# Patient Record
Sex: FEMALE | Race: OTHER | HISPANIC OR LATINO | URBAN - METROPOLITAN AREA
[De-identification: names, ages, dates, MRNs, and addresses within clinical notes are randomized per-mention and may not be internally consistent; named-entity substitution may affect disease eponyms.]

---
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
## 2023-01-06 LAB — SURGICAL PATHOLOGY STUDY: SIGNIFICANT CHANGE UP
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
## 2023-05-21 ENCOUNTER — EMERGENCY (EMERGENCY)
Facility: HOSPITAL | Age: 18
LOS: 1 days | Discharge: HOME | End: 2023-05-21
Attending: EMERGENCY MEDICINE
Payer: MEDICAID

## 2023-05-21 VITALS
HEART RATE: 94 BPM | DIASTOLIC BLOOD PRESSURE: 64 MMHG | SYSTOLIC BLOOD PRESSURE: 94 MMHG | RESPIRATION RATE: 16 BRPM | OXYGEN SATURATION: 95 % | TEMPERATURE: 98 F | WEIGHT: 115.08 LBS

## 2023-05-21 DIAGNOSIS — Z98.891 HISTORY OF UTERINE SCAR FROM PREVIOUS SURGERY: Chronic | ICD-10-CM

## 2023-05-21 DIAGNOSIS — Z98.890 OTHER SPECIFIED POSTPROCEDURAL STATES: Chronic | ICD-10-CM

## 2023-05-21 PROCEDURE — 10060 I&D ABSCESS SIMPLE/SINGLE: CPT

## 2023-05-21 PROCEDURE — 99283 EMERGENCY DEPT VISIT LOW MDM: CPT | Mod: 25

## 2023-05-21 NOTE — ED ADULT NURSE NOTE - NSFALLUNIVINTERV_ED_ALL_ED
Bed/Stretcher in lowest position, wheels locked, appropriate side rails in place/Call bell, personal items and telephone in reach/Instruct patient to call for assistance before getting out of bed/chair/stretcher/Non-slip footwear applied when patient is off stretcher/[place] to call system/Physically safe environment - no spills, clutter or unnecessary equipment/Purposeful proactive rounding/Room/bathroom lighting operational, light cord in reach

## 2023-05-21 NOTE — ED ADULT TRIAGE NOTE - CHIEF COMPLAINT QUOTE
used
Pt presents to ED c/o a boil on her back. States she popped it last night and copious amounts of puss came out. Reports pain today.

## 2023-05-21 NOTE — ED PROVIDER NOTE - PATIENT PORTAL LINK FT
You can access the FollowMyHealth Patient Portal offered by [facility] by registering at the following website: [email or web address]. By joining [employer]’s FollowMyHealth portal, you will also be able to view your health information using other applications (apps) compatible with our system.
yes

## 2023-05-21 NOTE — ED ADULT NURSE NOTE - CHIEF COMPLAINT QUOTE
Pt presents to ED c/o a boil on her back. States she popped it last night and copious amounts of puss came out. Reports pain today.

## 2023-05-21 NOTE — ED PROVIDER NOTE - ATTENDING APP SHARED VISIT CONTRIBUTION OF CARE
19yo F pw boil to the back since yesterday. notes that felt like a pimple that she popped but today the area felt swollen. mild nausea. Denies f/c/v/cp/sob/palpitations/ cough/rash/headache/dizziness/abd.pain/d/c/dysuria/hematuria    Head: atraumatic, normacephalic  Face: atraumatic, no crepitus no orbiral/maxillary/mandibular ttp  throat: uvula midline no exudates  eyes: perrla eomi  heart: rrr s1s2  lungs: ctab  abd: soft, nt nd +bs no rebound/guarding no cva ttp  skin: warm  LE: no swelling, no calf ttp  back: no midline cervical/thoracic/lumbar ttp  skin: + fluctuant draining area  + redness to left upper chest    -->abscess currently draining will i+d abx dc

## 2023-05-21 NOTE — ED PROVIDER NOTE - OBJECTIVE STATEMENT
17 y/o F c/o abscess on back x 3 days.  Patient states that it drained lots of pus yesterday.  Denies fever.

## 2023-05-21 NOTE — ED ADULT NURSE NOTE - NS PRO PASSIVE SMOKE EXP
Called by RN, on tele pt with bursts of PAF for 2 seconds to 170.  Pt has been asymptomatic.   D/W cardiology pt is s/p diagnostic cath w/radial access.   Xarelto was given at 3 am.   Called this morning at 6:30am  by RN pt briefly went HR of 42.   Pt back in NSR 50s-70s.   Vitals are stable.     f/u with cardiology in am     Vanesa Collins ANP-BC  [id number]. Unknown

## 2023-05-21 NOTE — ED ADULT NURSE NOTE - OBJECTIVE STATEMENT
Pt with wound to mid back that she states she was able to get a lot of pus out of last night. Pt noted to have small 3cm "boil" to mid back midline with spine. Pt with purulent draining noted and mild erythema around the site. Pt denies fever/chills.

## 2023-07-04 NOTE — ED PROVIDER NOTE - CLINICAL SUMMARY MEDICAL DECISION MAKING FREE TEXT BOX
presents for urine tox however now refusing test
sent in s/p abnormal CT abd done yesterday, hx dementia

## 2023-07-06 NOTE — ED ADULT NURSE NOTE - BRAND OF COVID-19 VACCINATION
Attempted to contact patient's mother to advise that patient is due for labs. [phone] rang then ringing stops. No VM.  [phone] has no VM, phone rings several times then rapid busy signal. Pfizer dose 1 and 2

## 2023-09-19 NOTE — OB RN PATIENT PROFILE - BILL OF RIGHTS/ADMISSION INFORMATION PROVIDED TO:
Ensure you are drinking plenty of fluids, especially water. Avoid spicy, greasy foods. Avoid caffeine and chocolate. You may take over the counter pepcid twice a day as directed for your GERD. Tylenol for pain. If you are not allergic to ibuprofen you may take this as well. Follow up with your primary care provider as needed or if no improvement. Return to the ED for worsening signs and symptoms or otherwise as needed.     
Patient

## 2023-10-19 NOTE — OB RN PATIENT PROFILE - VISION (WITH CORRECTIVE LENSES IF THE PATIENT USUALLY WEARS THEM):
normal/sore large/normal/compressible/sore Normal vision: sees adequately in most situations; can see medication labels, newsprint

## 2023-10-21 NOTE — OB RN PATIENT PROFILE - LIMIT VISITORS, INFANT PROFILE
48F with PMH of HTN, HLD and anxiety and PSHx of remote appendectomy, who first presented for abdominal pain after sexual intercourse. Admitted to surgery for treatment of SBO, which has since resolved. Continues to have abdominal pain and diarrhea, so GI consulted due to possible concern for IBD.     Given recent colonoscopy (August 2023) which was largely normal and no prior history of GI related issues, do not suspect IBD at this time. Suspect symptoms most likely 2/2 to infectious process, like gastroenteritis given patient came in with tachycardia, fever, and bandemia.      Recommendations:   - c diff and stool culture recently negative   - send GI PCR   - f/u read of repeat CT abdomen/pelvis     Case discussed with Dr. Rincon. GI Team will continue to follow.     Crystal Mak D.O.   Gastroenterology Fellow  Weekday 7am-5pm Pager: [phone]  Weeknights/Weekend/Holiday Coverage: Please call the  for contact info     no

## 2024-05-13 ENCOUNTER — OUTPATIENT (OUTPATIENT)
Dept: OUTPATIENT SERVICES | Facility: HOSPITAL | Age: 19
LOS: 1 days | End: 2024-05-13
Payer: MEDICAID

## 2024-05-13 ENCOUNTER — APPOINTMENT (OUTPATIENT)
Dept: ANTEPARTUM | Facility: HOSPITAL | Age: 19
End: 2024-05-13
Payer: MEDICAID

## 2024-05-13 VITALS — SYSTOLIC BLOOD PRESSURE: 97 MMHG | HEART RATE: 64 BPM | RESPIRATION RATE: 16 BRPM | DIASTOLIC BLOOD PRESSURE: 64 MMHG

## 2024-05-13 VITALS
HEART RATE: 69 BPM | RESPIRATION RATE: 16 BRPM | SYSTOLIC BLOOD PRESSURE: 107 MMHG | TEMPERATURE: 98 F | DIASTOLIC BLOOD PRESSURE: 70 MMHG

## 2024-05-13 DIAGNOSIS — O26.899 OTHER SPECIFIED PREGNANCY RELATED CONDITIONS, UNSPECIFIED TRIMESTER: ICD-10-CM

## 2024-05-13 DIAGNOSIS — Z98.891 HISTORY OF UTERINE SCAR FROM PREVIOUS SURGERY: Chronic | ICD-10-CM

## 2024-05-13 DIAGNOSIS — Z98.890 OTHER SPECIFIED POSTPROCEDURAL STATES: Chronic | ICD-10-CM

## 2024-05-13 LAB
APPEARANCE UR: ABNORMAL
BACTERIA # UR AUTO: ABNORMAL /HPF
BASOPHILS # BLD AUTO: 0.01 K/UL — SIGNIFICANT CHANGE UP (ref 0–0.2)
BASOPHILS NFR BLD AUTO: 0.3 % — SIGNIFICANT CHANGE UP (ref 0–2)
BILIRUB UR-MCNC: NEGATIVE — SIGNIFICANT CHANGE UP
COLOR SPEC: YELLOW — SIGNIFICANT CHANGE UP
DIFF PNL FLD: NEGATIVE — SIGNIFICANT CHANGE UP
EOSINOPHIL # BLD AUTO: 0.03 K/UL — SIGNIFICANT CHANGE UP (ref 0–0.5)
EOSINOPHIL NFR BLD AUTO: 0.8 % — SIGNIFICANT CHANGE UP (ref 0–6)
GLUCOSE UR QL: NEGATIVE MG/DL — SIGNIFICANT CHANGE UP
HBV SURFACE AG SERPL QL IA: SIGNIFICANT CHANGE UP
HCT VFR BLD CALC: 31.7 % — LOW (ref 34.5–45)
HGB BLD-MCNC: 10.5 G/DL — LOW (ref 11.5–15.5)
IMM GRANULOCYTES NFR BLD AUTO: 0.3 % — SIGNIFICANT CHANGE UP (ref 0–0.9)
KETONES UR-MCNC: NEGATIVE MG/DL — SIGNIFICANT CHANGE UP
LEUKOCYTE ESTERASE UR-ACNC: ABNORMAL
LYMPHOCYTES # BLD AUTO: 1.34 K/UL — SIGNIFICANT CHANGE UP (ref 1–3.3)
LYMPHOCYTES # BLD AUTO: 35.4 % — SIGNIFICANT CHANGE UP (ref 13–44)
MCHC RBC-ENTMCNC: 30.7 PG — SIGNIFICANT CHANGE UP (ref 27–34)
MCHC RBC-ENTMCNC: 33.1 GM/DL — SIGNIFICANT CHANGE UP (ref 32–36)
MCV RBC AUTO: 92.7 FL — SIGNIFICANT CHANGE UP (ref 80–100)
MONOCYTES # BLD AUTO: 0.28 K/UL — SIGNIFICANT CHANGE UP (ref 0–0.9)
MONOCYTES NFR BLD AUTO: 7.4 % — SIGNIFICANT CHANGE UP (ref 2–14)
NEUTROPHILS # BLD AUTO: 2.12 K/UL — SIGNIFICANT CHANGE UP (ref 1.8–7.4)
NEUTROPHILS NFR BLD AUTO: 55.8 % — SIGNIFICANT CHANGE UP (ref 43–77)
NITRITE UR-MCNC: NEGATIVE — SIGNIFICANT CHANGE UP
PH UR: 7.5 — SIGNIFICANT CHANGE UP (ref 5–8)
PLATELET # BLD AUTO: 155 K/UL — SIGNIFICANT CHANGE UP (ref 150–400)
PROT UR-MCNC: SIGNIFICANT CHANGE UP MG/DL
RBC # BLD: 3.42 M/UL — LOW (ref 3.8–5.2)
RBC # FLD: 13.2 % — SIGNIFICANT CHANGE UP (ref 10.3–14.5)
RBC CASTS # UR COMP ASSIST: 1 /HPF — SIGNIFICANT CHANGE UP (ref 0–4)
SP GR SPEC: 1.02 — SIGNIFICANT CHANGE UP (ref 1–1.03)
SQUAMOUS # UR AUTO: 7 /HPF — HIGH (ref 0–5)
UROBILINOGEN FLD QL: 1 MG/DL — SIGNIFICANT CHANGE UP (ref 0.2–1)
WBC # BLD: 3.79 K/UL — LOW (ref 3.8–10.5)
WBC # FLD AUTO: 3.79 K/UL — LOW (ref 3.8–10.5)
WBC UR QL: 15 /HPF — HIGH (ref 0–5)

## 2024-05-13 PROCEDURE — 81001 URINALYSIS AUTO W/SCOPE: CPT

## 2024-05-13 PROCEDURE — 86762 RUBELLA ANTIBODY: CPT

## 2024-05-13 PROCEDURE — 86803 HEPATITIS C AB TEST: CPT

## 2024-05-13 PROCEDURE — 85025 COMPLETE CBC W/AUTO DIFF WBC: CPT

## 2024-05-13 PROCEDURE — 76819 FETAL BIOPHYS PROFIL W/O NST: CPT | Mod: 26

## 2024-05-13 PROCEDURE — 87077 CULTURE AEROBIC IDENTIFY: CPT

## 2024-05-13 PROCEDURE — 87086 URINE CULTURE/COLONY COUNT: CPT

## 2024-05-13 PROCEDURE — 36415 COLL VENOUS BLD VENIPUNCTURE: CPT

## 2024-05-13 PROCEDURE — 86765 RUBEOLA ANTIBODY: CPT

## 2024-05-13 PROCEDURE — 59025 FETAL NON-STRESS TEST: CPT

## 2024-05-13 PROCEDURE — 76820 UMBILICAL ARTERY ECHO: CPT | Mod: 26,59

## 2024-05-13 PROCEDURE — 76816 OB US FOLLOW-UP PER FETUS: CPT | Mod: 26

## 2024-05-13 PROCEDURE — 87186 SC STD MICRODIL/AGAR DIL: CPT

## 2024-05-13 PROCEDURE — G0463: CPT

## 2024-05-13 PROCEDURE — 86780 TREPONEMA PALLIDUM: CPT

## 2024-05-13 PROCEDURE — 96374 THER/PROPH/DIAG INJ IV PUSH: CPT

## 2024-05-13 PROCEDURE — 87340 HEPATITIS B SURFACE AG IA: CPT

## 2024-05-13 RX ORDER — ACETAMINOPHEN 500 MG
1000 TABLET ORAL ONCE
Refills: 0 | Status: COMPLETED | OUTPATIENT
Start: 2024-05-13 | End: 2024-05-13

## 2024-05-13 RX ADMIN — Medication 400 MILLIGRAM(S): at 17:34

## 2024-05-13 RX ADMIN — Medication 1000 MILLIGRAM(S): at 18:04

## 2024-05-13 NOTE — OB RN TRIAGE NOTE - BP NONINVASIVE SYSTOLIC (MM HG)
[facility]   Diabetes Management       Date:  10/11/2023    Patient:  Cydney Soto     MRN:  [id number]    Gestational age:  28w5d       Diagnosis:  Gestational Diabetes A1    Management Role:  Phase I:  Counseling and Education     Insulin dosing:   N/a - Goal at this time is diet-control.     Summary   The patient was seen for gestational diabetes education. Pt accompanied this morning by supportive spouse Jese. Pt has started BG checks. Review when to check BGs. Discuss BG targets for pregnancy.   Meal planning and problem solving discussed. Patient verbalizes understanding. Pt given a copy of BG log.   Pt reports scheduled OB f/u the .   Thank you for this referral. Please don't hesitate to contact me with questions.     Referring provider:  Cydney Hsu, Aprn  [address]  [place] [address]      Cydney Goldstein RN, BSN, [facility]  10/11/2023  15:02 EDT   107

## 2024-05-13 NOTE — OB RN TRIAGE NOTE - CHIEF COMPLAINT QUOTE
I had bleeding but I had no baby sitting but they told me I had to come to the hospital and now I'm here.

## 2024-05-13 NOTE — OB PROVIDER TRIAGE NOTE - NSOBPROVIDERNOTE_OBGYN_ALL_OB_FT
A/P: 18y  at 32w4d GA who presents to L&D for rule out  labor    SSE 0/0/-3  UA WNL  Ofirmev for pain  Recommend use of belly band  Prenatals sent  Fetus: 130 baseline, moderate variability, + accels, -decels  [place]: No contractions  Not in labor at this time  Dispo: Pending    Discussed with Dr. Vragas A/P: 18y  at 32w4d GA who presents to L&D for rule out  labor    SSE 0/0/-3  UA WNL  Ofirmev for pain  Recommend use of belly band  Prenatals sent  Growth 1680g, BPP 8/8, RHIANNA 10.7cm; Vertex anterior; consistent with dates. Images uploaded to [facility]  Fetus: 130 baseline, moderate variability, + accels, -decels  [place]: No contractions  Not in labor at this time  Dispo: Home with precautions    Discussed with Dr. Vargas A/P: 18y  at 32w4d GA who presents to L&D for rule out  labor    SSE 0/0/-3  UA WNL  Ofirmev for pain  Recommend use of belly band  Prenatals sent  Growth 1680g (7th percentile), BPP 8/8, RHIANNA 10.7cm; Vertex anterior; consistent with dates. Images uploaded to [facility]  Needs outpatient MFM consult  Fetus: 130 baseline, moderate variability, + accels, -decels  [place]: No contractions  Not in labor at this time  Dispo: Home with precautions    Discussed with Dr. Vargas

## 2024-05-13 NOTE — OB PROVIDER TRIAGE NOTE - NSHPPHYSICALEXAM_GEN_ALL_CORE
T(C): 36.8 (05-13-24 @ 17:01), Max: 36.8 (05-13-24 @ 17:00)  HR: 69 (05-13-24 @ 17:01) (69 - 69)  BP: 107/70 (05-13-24 @ 17:01) (107/70 - 107/70)  RR: 16 (05-13-24 @ 17:01) (16 - 16)  SpO2: --  Gen: NAD, well-appearing  Abd: soft, gravid  Ext: non-edematous, non-tender   SVE: 0/0/-3  FHT: 130 baseline, moderate variability, + accels, -decels  [place]: No contractions

## 2024-05-13 NOTE — OB RN TRIAGE NOTE - FALL HARM RISK - RISK INTERVENTIONS

## 2024-05-13 NOTE — OB PROVIDER TRIAGE NOTE - HISTORY OF PRESENT ILLNESS
JANENE MONROE is a 18y  at 32w4d GA who presents to L&D for abdominal and back pain. patient states that she has pain consistently, and does not feel any contractions.     Pt denies vaginal bleeding, contractions and leakage of fluid. She endorses good fetal movement.   Pt denies trauma.  Pt denies headaches, visual disturbances, RUQ pain, epigastric pain and new-onset edema.   She denies any urinary complaints.   She denies fevers, chills, nausea, vomiting.   She denies shortness of breath, chest pain, and palpitations.    Pregnancy course is significant for:  Limited PNC    POB: CSx2, G1: previa at 35 wks. G2 rCS uncomplicated  PGYN: -fibroids/-cysts, denied STD hx, denies abnormal PAPs  PMH: Anemia  PSH: CSx2, hernia repair  SH: Uses marijuana during pregnancy  Meds: PNV  All: NKDA

## 2024-05-14 LAB
HCV AB S/CO SERPL IA: 0.08 S/CO — SIGNIFICANT CHANGE UP (ref 0–0.99)
HCV AB SERPL-IMP: SIGNIFICANT CHANGE UP
T PALLIDUM AB TITR SER: NEGATIVE — SIGNIFICANT CHANGE UP

## 2024-05-15 DIAGNOSIS — Z3A.32 32 WEEKS GESTATION OF PREGNANCY: ICD-10-CM

## 2024-05-15 DIAGNOSIS — R10.9 UNSPECIFIED ABDOMINAL PAIN: ICD-10-CM

## 2024-05-15 DIAGNOSIS — O99.891 OTHER SPECIFIED DISEASES AND CONDITIONS COMPLICATING PREGNANCY: ICD-10-CM

## 2024-05-15 DIAGNOSIS — M54.9 DORSALGIA, UNSPECIFIED: ICD-10-CM

## 2024-05-15 DIAGNOSIS — O34.219 MATERNAL CARE FOR UNSPECIFIED TYPE SCAR FROM PREVIOUS CESAREAN DELIVERY: ICD-10-CM

## 2024-05-15 DIAGNOSIS — O26.893 OTHER SPECIFIED PREGNANCY RELATED CONDITIONS, THIRD TRIMESTER: ICD-10-CM

## 2024-05-15 DIAGNOSIS — D64.9 ANEMIA, UNSPECIFIED: ICD-10-CM

## 2024-05-15 DIAGNOSIS — F12.90 CANNABIS USE, UNSPECIFIED, UNCOMPLICATED: ICD-10-CM

## 2024-05-15 DIAGNOSIS — O99.343 OTHER MENTAL DISORDERS COMPLICATING PREGNANCY, THIRD TRIMESTER: ICD-10-CM

## 2024-05-15 DIAGNOSIS — O09.13 SUPERVISION OF PREGNANCY WITH HISTORY OF ECTOPIC PREGNANCY, THIRD TRIMESTER: ICD-10-CM

## 2024-05-15 DIAGNOSIS — O99.013 ANEMIA COMPLICATING PREGNANCY, THIRD TRIMESTER: ICD-10-CM

## 2024-05-15 LAB
MEV IGG SER-ACNC: >300 AU/ML — SIGNIFICANT CHANGE UP
MEV IGG+IGM SER-IMP: POSITIVE — SIGNIFICANT CHANGE UP
RUBV IGG SER-ACNC: 5.1 INDEX — SIGNIFICANT CHANGE UP
RUBV IGG SER-IMP: POSITIVE — SIGNIFICANT CHANGE UP

## 2024-05-21 ENCOUNTER — OUTPATIENT (OUTPATIENT)
Dept: OUTPATIENT SERVICES | Facility: HOSPITAL | Age: 19
LOS: 1 days | End: 2024-05-21
Payer: MEDICAID

## 2024-05-21 VITALS — HEART RATE: 73 BPM | SYSTOLIC BLOOD PRESSURE: 110 MMHG | DIASTOLIC BLOOD PRESSURE: 67 MMHG

## 2024-05-21 VITALS
TEMPERATURE: 98 F | DIASTOLIC BLOOD PRESSURE: 53 MMHG | RESPIRATION RATE: 19 BRPM | SYSTOLIC BLOOD PRESSURE: 109 MMHG | HEART RATE: 84 BPM

## 2024-05-21 DIAGNOSIS — O26.899 OTHER SPECIFIED PREGNANCY RELATED CONDITIONS, UNSPECIFIED TRIMESTER: ICD-10-CM

## 2024-05-21 DIAGNOSIS — Z98.891 HISTORY OF UTERINE SCAR FROM PREVIOUS SURGERY: Chronic | ICD-10-CM

## 2024-05-21 DIAGNOSIS — Z98.890 OTHER SPECIFIED POSTPROCEDURAL STATES: Chronic | ICD-10-CM

## 2024-05-21 PROCEDURE — 83986 ASSAY PH BODY FLUID NOS: CPT

## 2024-05-21 PROCEDURE — 59025 FETAL NON-STRESS TEST: CPT

## 2024-05-21 PROCEDURE — G0463: CPT

## 2024-05-21 RX ORDER — FOSFOMYCIN TROMETHAMINE 3 G/1
1 POWDER ORAL
Qty: 1 | Refills: 0
Start: 2024-05-21

## 2024-05-21 NOTE — OB PROVIDER TRIAGE NOTE - HISTORY OF PRESENT ILLNESS
19y  @33.5w GA presents to L&D c/o suprapubic pain and a gush of fluid around noon. Patient states that she had a gush of clear liquid on her way to the bathroom, denies any continued leakage. She also reports persistent anterior abdominal pain. Denies hematuria, dysuria. Denies contractions, vaginal bleeding, decreased fetal movement. She was seen in triage a couple of weeks ago for similar complaints, UCx positive for UTI, not yet treated. Denies fever/chills. Of note, she found out about her pregnancy in her 5th month, used alcohol prior and currently uses marijuana. She has not had any prenatal care.    Pregnancy course:  lack of prenatal care    POB: CSx2, G1: previa at 35 wks. G2 rCS uncomplicated  PGYN: -fibroids/-cysts, denied STD hx, denies abnormal PAPs  PMH: Anemia  PSH: CSx2, hernia repair  SH: Uses marijuana during pregnancy  Meds: PNV  All: NKDA

## 2024-05-21 NOTE — OB PROVIDER TRIAGE NOTE - NSHPPHYSICALEXAM_GEN_ALL_CORE
Vital Signs Last 24 Hrs  T(C): 36.9 (21 May 2024 16:59), Max: 36.9 (21 May 2024 16:43)  T(F): 98.42 (21 May 2024 16:59), Max: 98.42 (21 May 2024 16:59)  HR: 73 (21 May 2024 18:17) (73 - 84)  BP: 110/67 (21 May 2024 18:17) (109/53 - 110/67)  RR: 19 (21 May 2024 16:59) (19 - 19)    Parameters below as of 21 May 2024 16:43  Patient On (Oxygen Delivery Method): room air    Gen: well-appearing, NAD  Neuro: A&Ox3  Resp: breathing comfortably on RA   Abd: nontender, gravid   SSE: cervix appeared closed, no leakage with valsalva, no pooling in vagina, nitrazine negative  FHT: baseline 125, mod variability, +accels, -decels  [place]: no ctx

## 2024-05-21 NOTE — OB PROVIDER TRIAGE NOTE - NSOBPROVIDERNOTE_OBGYN_ALL_OB_FT
19y  @33.5w GA evaluated for leakage of fluid and abdominal pain.    - suprapubic pain likely secondary to UTI, fosfomycin 3g sent to pharmacy  - pelvic exam w/o vaginal pooling or leakage of fluid, nitrazine negative, RHIANNA 5.35cm, decreased from prior ultrasound but still within normal limits. Pt has follow up with [facility] tomorrow at 0930. Return precautions reviewed  - FHT reactive, rare contractions  - pt to follow up w MFM and Ob

## 2024-05-21 NOTE — OB RN TRIAGE NOTE - NS_TRIAGEADDITIONAL COMMENTS_OBGYN_ALL_OB_FT
[FreeTextEntry1] : -discussed being seen in the ER if constant fevers, he defers at this time, reports he was seen in the ER and discharged home -follow-up lab work  -follow-up with ID  -information for [facility] clinic provide as he will be transferring care given personal cost with recent insurance loss  -he will call me and let me know progress/when he has an appointment  Amniswab negative, no fluid noted on spec exam. Bedside sono done for RHIANNA. Pt discharged home with RX to be picked up for UTI. Pt given discharge instructions. PT is without any questions or complaints

## 2024-05-21 NOTE — OB RN TRIAGE NOTE - FALL HARM RISK - RISK INTERVENTIONS

## 2024-05-21 NOTE — OB RN TRIAGE NOTE - NSSDOHTHREATEN_OBGYN_A_OB
Post-application: Motor, sensory, and vascular responses intact in the injured extremity./Pre-application: Motor, sensory, and vascular responses intact in the injured extremity./The patient/caregiver verbalized understanding of how to care for the injured extremity with splint
never

## 2024-05-23 DIAGNOSIS — Z03.71 ENCOUNTER FOR SUSPECTED PROBLEM WITH AMNIOTIC CAVITY AND MEMBRANE RULED OUT: ICD-10-CM

## 2024-05-23 DIAGNOSIS — Z3A.33 33 WEEKS GESTATION OF PREGNANCY: ICD-10-CM

## 2024-05-23 DIAGNOSIS — D64.9 ANEMIA, UNSPECIFIED: ICD-10-CM

## 2024-05-23 DIAGNOSIS — O36.5930 MATERNAL CARE FOR OTHER KNOWN OR SUSPECTED POOR FETAL GROWTH, THIRD TRIMESTER, NOT APPLICABLE OR UNSPECIFIED: ICD-10-CM

## 2024-05-23 DIAGNOSIS — O99.013 ANEMIA COMPLICATING PREGNANCY, THIRD TRIMESTER: ICD-10-CM

## 2024-05-27 ENCOUNTER — OUTPATIENT (OUTPATIENT)
Dept: INPATIENT UNIT | Facility: HOSPITAL | Age: 19
LOS: 1 days | End: 2024-05-27
Payer: MEDICAID

## 2024-05-27 VITALS
TEMPERATURE: 98 F | RESPIRATION RATE: 17 BRPM | HEART RATE: 70 BPM | DIASTOLIC BLOOD PRESSURE: 67 MMHG | SYSTOLIC BLOOD PRESSURE: 103 MMHG

## 2024-05-27 VITALS
DIASTOLIC BLOOD PRESSURE: 62 MMHG | SYSTOLIC BLOOD PRESSURE: 105 MMHG | RESPIRATION RATE: 19 BRPM | TEMPERATURE: 99 F | HEART RATE: 66 BPM

## 2024-05-27 DIAGNOSIS — Z98.891 HISTORY OF UTERINE SCAR FROM PREVIOUS SURGERY: Chronic | ICD-10-CM

## 2024-05-27 DIAGNOSIS — Z98.890 OTHER SPECIFIED POSTPROCEDURAL STATES: Chronic | ICD-10-CM

## 2024-05-27 DIAGNOSIS — O26.899 OTHER SPECIFIED PREGNANCY RELATED CONDITIONS, UNSPECIFIED TRIMESTER: ICD-10-CM

## 2024-05-27 LAB
ALBUMIN SERPL ELPH-MCNC: 3.2 G/DL — LOW (ref 3.3–5.2)
ALP SERPL-CCNC: 118 U/L — SIGNIFICANT CHANGE UP (ref 40–120)
ALT FLD-CCNC: 7 U/L — SIGNIFICANT CHANGE UP
AMYLASE P1 CFR SERPL: 81 U/L — SIGNIFICANT CHANGE UP (ref 36–128)
ANION GAP SERPL CALC-SCNC: 14 MMOL/L — SIGNIFICANT CHANGE UP (ref 5–17)
APPEARANCE UR: ABNORMAL
APPEARANCE UR: CLEAR — SIGNIFICANT CHANGE UP
APTT BLD: 27.3 SEC — SIGNIFICANT CHANGE UP (ref 24.5–35.6)
AST SERPL-CCNC: 16 U/L — SIGNIFICANT CHANGE UP
BACTERIA # UR AUTO: ABNORMAL /HPF
BACTERIA # UR AUTO: ABNORMAL /HPF
BASOPHILS # BLD AUTO: 0 K/UL — SIGNIFICANT CHANGE UP (ref 0–0.2)
BASOPHILS NFR BLD AUTO: 0 % — SIGNIFICANT CHANGE UP (ref 0–2)
BILIRUB SERPL-MCNC: 0.4 MG/DL — SIGNIFICANT CHANGE UP (ref 0.4–2)
BILIRUB UR-MCNC: NEGATIVE — SIGNIFICANT CHANGE UP
BILIRUB UR-MCNC: NEGATIVE — SIGNIFICANT CHANGE UP
BUN SERPL-MCNC: 5.2 MG/DL — LOW (ref 8–20)
CALCIUM SERPL-MCNC: 8.7 MG/DL — SIGNIFICANT CHANGE UP (ref 8.4–10.5)
CAST: 0 /LPF — SIGNIFICANT CHANGE UP (ref 0–4)
CAST: 5 /LPF — HIGH (ref 0–4)
CHLORIDE SERPL-SCNC: 99 MMOL/L — SIGNIFICANT CHANGE UP (ref 96–108)
CO2 SERPL-SCNC: 23 MMOL/L — SIGNIFICANT CHANGE UP (ref 22–29)
COLOR SPEC: YELLOW — SIGNIFICANT CHANGE UP
COLOR SPEC: YELLOW — SIGNIFICANT CHANGE UP
CREAT ?TM UR-MCNC: 27 MG/DL — SIGNIFICANT CHANGE UP
CREAT SERPL-MCNC: 0.47 MG/DL — LOW (ref 0.5–1.3)
DIFF PNL FLD: NEGATIVE — SIGNIFICANT CHANGE UP
DIFF PNL FLD: NEGATIVE — SIGNIFICANT CHANGE UP
EGFR: 139 ML/MIN/1.73M2 — SIGNIFICANT CHANGE UP
EOSINOPHIL # BLD AUTO: 0.06 K/UL — SIGNIFICANT CHANGE UP (ref 0–0.5)
EOSINOPHIL NFR BLD AUTO: 1.7 % — SIGNIFICANT CHANGE UP (ref 0–6)
FIBRINOGEN PPP-MCNC: 430 MG/DL — SIGNIFICANT CHANGE UP (ref 200–450)
GIANT PLATELETS BLD QL SMEAR: PRESENT — SIGNIFICANT CHANGE UP
GLUCOSE SERPL-MCNC: 66 MG/DL — LOW (ref 70–99)
GLUCOSE UR QL: NEGATIVE MG/DL — SIGNIFICANT CHANGE UP
GLUCOSE UR QL: NEGATIVE MG/DL — SIGNIFICANT CHANGE UP
HCT VFR BLD CALC: 30.9 % — LOW (ref 34.5–45)
HGB BLD-MCNC: 10.8 G/DL — LOW (ref 11.5–15.5)
HPIV3 RNA SPEC QL NAA+PROBE: DETECTED
INR BLD: 0.84 RATIO — LOW (ref 0.85–1.18)
KETONES UR-MCNC: NEGATIVE MG/DL — SIGNIFICANT CHANGE UP
KETONES UR-MCNC: NEGATIVE MG/DL — SIGNIFICANT CHANGE UP
LDH SERPL L TO P-CCNC: 192 U/L — SIGNIFICANT CHANGE UP (ref 98–192)
LEUKOCYTE ESTERASE UR-ACNC: ABNORMAL
LEUKOCYTE ESTERASE UR-ACNC: NEGATIVE — SIGNIFICANT CHANGE UP
LIDOCAIN IGE QN: 32 U/L — SIGNIFICANT CHANGE UP (ref 22–51)
LYMPHOCYTES # BLD AUTO: 0.72 K/UL — LOW (ref 1–3.3)
LYMPHOCYTES # BLD AUTO: 21.8 % — SIGNIFICANT CHANGE UP (ref 13–44)
MANUAL SMEAR VERIFICATION: SIGNIFICANT CHANGE UP
MCHC RBC-ENTMCNC: 31.5 PG — SIGNIFICANT CHANGE UP (ref 27–34)
MCHC RBC-ENTMCNC: 35 GM/DL — SIGNIFICANT CHANGE UP (ref 32–36)
MCV RBC AUTO: 90.1 FL — SIGNIFICANT CHANGE UP (ref 80–100)
MONOCYTES # BLD AUTO: 0.26 K/UL — SIGNIFICANT CHANGE UP (ref 0–0.9)
MONOCYTES NFR BLD AUTO: 7.8 % — SIGNIFICANT CHANGE UP (ref 2–14)
NEUTROPHILS # BLD AUTO: 2.27 K/UL — SIGNIFICANT CHANGE UP (ref 1.8–7.4)
NEUTROPHILS NFR BLD AUTO: 68.7 % — SIGNIFICANT CHANGE UP (ref 43–77)
NITRITE UR-MCNC: NEGATIVE — SIGNIFICANT CHANGE UP
NITRITE UR-MCNC: NEGATIVE — SIGNIFICANT CHANGE UP
PH UR: 7.5 — SIGNIFICANT CHANGE UP (ref 5–8)
PH UR: 8.5 (ref 5–8)
PLAT MORPH BLD: NORMAL — SIGNIFICANT CHANGE UP
PLATELET # BLD AUTO: 157 K/UL — SIGNIFICANT CHANGE UP (ref 150–400)
POTASSIUM SERPL-MCNC: 3.6 MMOL/L — SIGNIFICANT CHANGE UP (ref 3.5–5.3)
POTASSIUM SERPL-SCNC: 3.6 MMOL/L — SIGNIFICANT CHANGE UP (ref 3.5–5.3)
PROT ?TM UR-MCNC: 6 MG/DL — SIGNIFICANT CHANGE UP (ref 0–12)
PROT SERPL-MCNC: 6.6 G/DL — SIGNIFICANT CHANGE UP (ref 6.6–8.7)
PROT UR-MCNC: NEGATIVE MG/DL — SIGNIFICANT CHANGE UP
PROT UR-MCNC: SIGNIFICANT CHANGE UP MG/DL
PROT/CREAT UR-RTO: 0.2 RATIO — SIGNIFICANT CHANGE UP
PROTHROM AB SERPL-ACNC: 9.4 SEC — LOW (ref 9.5–13)
RAPID RVP RESULT: DETECTED
RBC # BLD: 3.43 M/UL — LOW (ref 3.8–5.2)
RBC # FLD: 13.8 % — SIGNIFICANT CHANGE UP (ref 10.3–14.5)
RBC BLD AUTO: NORMAL — SIGNIFICANT CHANGE UP
RBC CASTS # UR COMP ASSIST: 0 /HPF — SIGNIFICANT CHANGE UP (ref 0–4)
RBC CASTS # UR COMP ASSIST: 1 /HPF — SIGNIFICANT CHANGE UP (ref 0–4)
SARS-COV-2 RNA SPEC QL NAA+PROBE: SIGNIFICANT CHANGE UP
SMUDGE CELLS # BLD: PRESENT — SIGNIFICANT CHANGE UP
SODIUM SERPL-SCNC: 136 MMOL/L — SIGNIFICANT CHANGE UP (ref 135–145)
SP GR SPEC: 1.02 — SIGNIFICANT CHANGE UP (ref 1–1.03)
SP GR SPEC: <1.005 — LOW (ref 1–1.03)
SQUAMOUS # UR AUTO: 14 /HPF — HIGH (ref 0–5)
SQUAMOUS # UR AUTO: 7 /HPF — HIGH (ref 0–5)
TRANS CELLS #/AREA URNS HPF: PRESENT
URATE SERPL-MCNC: 3.8 MG/DL — SIGNIFICANT CHANGE UP (ref 2.4–5.7)
UROBILINOGEN FLD QL: 0.2 MG/DL — SIGNIFICANT CHANGE UP (ref 0.2–1)
UROBILINOGEN FLD QL: 1 MG/DL — SIGNIFICANT CHANGE UP (ref 0.2–1)
WBC # BLD: 3.31 K/UL — LOW (ref 3.8–10.5)
WBC # FLD AUTO: 3.31 K/UL — LOW (ref 3.8–10.5)
WBC UR QL: 2 /HPF — SIGNIFICANT CHANGE UP (ref 0–5)
WBC UR QL: 4 /HPF — SIGNIFICANT CHANGE UP (ref 0–5)

## 2024-05-27 PROCEDURE — 84550 ASSAY OF BLOOD/URIC ACID: CPT

## 2024-05-27 PROCEDURE — 59025 FETAL NON-STRESS TEST: CPT | Mod: 26

## 2024-05-27 PROCEDURE — 85384 FIBRINOGEN ACTIVITY: CPT

## 2024-05-27 PROCEDURE — 81001 URINALYSIS AUTO W/SCOPE: CPT

## 2024-05-27 PROCEDURE — 0225U NFCT DS DNA&RNA 21 SARSCOV2: CPT

## 2024-05-27 PROCEDURE — 83690 ASSAY OF LIPASE: CPT

## 2024-05-27 PROCEDURE — 59025 FETAL NON-STRESS TEST: CPT

## 2024-05-27 PROCEDURE — 96360 HYDRATION IV INFUSION INIT: CPT

## 2024-05-27 PROCEDURE — 36415 COLL VENOUS BLD VENIPUNCTURE: CPT

## 2024-05-27 PROCEDURE — 80053 COMPREHEN METABOLIC PANEL: CPT

## 2024-05-27 PROCEDURE — 85025 COMPLETE CBC W/AUTO DIFF WBC: CPT

## 2024-05-27 PROCEDURE — 82150 ASSAY OF AMYLASE: CPT

## 2024-05-27 PROCEDURE — 82570 ASSAY OF URINE CREATININE: CPT

## 2024-05-27 PROCEDURE — G0463: CPT

## 2024-05-27 PROCEDURE — 99221 1ST HOSP IP/OBS SF/LOW 40: CPT | Mod: 25,GC

## 2024-05-27 PROCEDURE — 85730 THROMBOPLASTIN TIME PARTIAL: CPT

## 2024-05-27 PROCEDURE — 83615 LACTATE (LD) (LDH) ENZYME: CPT

## 2024-05-27 PROCEDURE — 85610 PROTHROMBIN TIME: CPT

## 2024-05-27 PROCEDURE — 84156 ASSAY OF PROTEIN URINE: CPT

## 2024-05-27 RX ORDER — SODIUM CHLORIDE 9 MG/ML
1000 INJECTION, SOLUTION INTRAVENOUS ONCE
Refills: 0 | Status: DISCONTINUED | OUTPATIENT
Start: 2024-05-27 | End: 2024-06-10

## 2024-05-27 NOTE — OB RN TRIAGE NOTE - NSICDXPASTMEDICALHX_GEN_ALL_CORE_FT
PAST MEDICAL HISTORY:  Asthma     Other iron deficiency anemia pt can't specify    Rheumatoid arthritis     Therapeutic

## 2024-05-27 NOTE — OB PROVIDER TRIAGE NOTE - NSHPPHYSICALEXAM_GEN_ALL_CORE
T(C): 36.6 (05-27-24 @ 10:44), Max: 36.6 (05-27-24 @ 10:39)  HR: 65 (05-27-24 @ 12:26) (65 - 70)  BP: 114/61 (05-27-24 @ 12:26) (103/67 - 114/61)  RR: 17 (05-27-24 @ 10:44) (17 - 17)    Gen: NAD  Heart: S1 S2, RRR  Lungs: Clear breath sounds bilaterally, no wheezing, rhonchi, or rales.  Abd: soft, gravid, minimally tender to bilateral upper quadrants  Ext: non-edematous, non-tender   SVE: 0/0/-3 x2  SSE: cervix visualized, closed and without any signs of bleeding or drainage, no pooling   FHT: baseline FHR 130s, moderate variability, +accels, -decels  [place]: irregular infrequent contractions not felt by patient

## 2024-05-27 NOTE — OB PROVIDER TRIAGE NOTE - NSOBPROVIDERNOTE_OBGYN_ALL_OB_FT
JANENE MONROE is a 19y  at 34w4d GA who presents to L&D for flu-like symptoms.    A/P:   -VSS, normotensive, afebrile, HR wnl  -CBC, CMP, amylase, and lipase all wnl for evaluation of abdominal pain.  -Patient presenting with cough, congestion, and cheat pain. Breath sound clear bilaterally. Chest pain likely due to flu-like symptoms and severe cough.  -Pending RVP results.  -BPP 10/10, RHIANNA 9.4, vertex presentation, anterior placenta  -Patient with recent UTI s/p treatment. UCx collected and pending.  -Patient present with toddler. 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 Patient is taking toddler to ED here now for evaluation of flu-like symptoms Recommend patient speak with social work in the ED for resources.  -Return precautions given  -Follow up with OB provider within a week.    Fetus: NST reactive  [place]: irregular infrequent contractions.  Dispo: Clear for discharge    Discussed with Dr. Oscar

## 2024-05-27 NOTE — OB PROVIDER TRIAGE NOTE - ATTENDING COMMENTS
19y  at 34w4d GA who presents to L&D for flu-like symptoms for the past 4 days, herself as well as her toddler and found to be flu positive, no s/s  labor, PE or pneumonia, encouraged rest and hydration. Return precautions discussed. 19y  at 34w4d GA who presents to L&D for flu-like symptoms for the past 4 days, herself as well as her toddler and found to be flu positive, no s/s  labor, PE or pneumonia, encouraged rest and hydration. Return precautions discussed.  Impression NST: reactive

## 2024-05-27 NOTE — CHART NOTE - NSCHARTNOTEFT_GEN_A_CORE
Patient contacted via phone after being discharged from triage. Patient notified of + parainfluenza 3 result on RVP and notified to have any sick contacts evaluated. Patient endorsed understanding and was given the opportunity to ask questions.

## 2024-05-27 NOTE — OB RN TRIAGE NOTE - FALL HARM RISK - RISK INTERVENTIONS

## 2024-05-27 NOTE — OB PROVIDER TRIAGE NOTE - HISTORY OF PRESENT ILLNESS
JANENE MONROE is a 19y  at 34w4d GA who presents to L&D for flu-like symptoms Patient reports cough and congestion for several days. Reports chest pain that feel like "stabbing" when taking a deep breath but also present when not taking a deep breath. Patient reports generalized abdominal pain that is worse with fetal movements. Reports abdominal pain has always been presents during this pregnancy but it worse today. Patient reports decreased fetal movements. Reports fetal movements are usually really active but today she only feels "flutters".     Pt denies vaginal bleeding, contractions and leakage of fluid. She endorses good fetal movement.     Pt denies trauma. Her last cervical exam was . Last sexual intercourse was .     Pt denies headaches, visual disturbances, RUQ pain, epigastric pain and new-onset edema.     She denies any urinary complaints.     She denies fevers, chills, nausea, vomiting.     She denies shortness of breath, chest pain, and palpitations.      Pregnancy course:  lack of prenatal care    POB: CSx2, G1: previa at 35 wks. G2 rCS uncomplicated  PGYN: -fibroids/-cysts, denied STD hx, denies abnormal PAPs  PMH: Anemia  PSH: CSx2, hernia repair  SH: Uses marijuana during pregnancy  Meds: PNV  All: NKDA      T(C): 36.6 (24 @ 10:44), Max: 36.6 (24 @ 10:39)  HR: 65 (24 @ 12:26) (65 - 70)  BP: 114/61 (24 @ 12:26) (103/67 - 114/61)  RR: 17 (24 @ 10:44) (17 - 17)  SpO2: --  Gen: NAD, well-appearing  Heart: S1 S2, RRR  Lungs: CTAB  Abd: soft, gravid  Ext: non-edematous, non-tender   SVE:   SSE: cervix visualized, closed and without any signs of bleeding or drainage, no pooling   FHT:   [place]:    A/P:     Fetus:  [place]:  Dispo: Continue to observe.     Discussed with Dr. Oscar JANENE MONROE is a 19y  at 34w4d GA who presents to L&D for flu-like symptoms. Patient reports cough and congestion for several days. Reports chest pain that feel like "stabbing" when taking a deep breath but also present when not taking a deep breath. Reports SOB due to nasal congestion. Patient reports generalized abdominal pain that is worse with fetal movements. Reports abdominal pain has always been present during this pregnancy but it worse today. Patient reports decreased fetal movements. Reports fetal movements are usually really active but today she only feels "flutters". She denies fevers, chills, nausea, vomiting. Pt denies vaginal bleeding, contractions and leakage of fluid. Pt denies headaches, visual disturbances, and new-onset edema. She denies any urinary complaints. She has a history of a recent UTI for which she completed ABX treatment.    Pregnancy course:  lack of prenatal care    POB: CSx2, G1: previa at 35 wks. G2 rCS uncomplicated  PGYN: -fibroids/-cysts, denied STD hx, denies abnormal PAPs  PMH: Anemia  PSH: CSx2, hernia repair  SH: Uses marijuana during pregnancy  Meds: PNV  All: NKDA

## 2024-05-29 DIAGNOSIS — O23.43 UNSPECIFIED INFECTION OF URINARY TRACT IN PREGNANCY, THIRD TRIMESTER: ICD-10-CM

## 2024-05-29 DIAGNOSIS — D64.9 ANEMIA, UNSPECIFIED: ICD-10-CM

## 2024-05-29 DIAGNOSIS — O34.219 MATERNAL CARE FOR UNSPECIFIED TYPE SCAR FROM PREVIOUS CESAREAN DELIVERY: ICD-10-CM

## 2024-05-29 DIAGNOSIS — O36.8130 DECREASED FETAL MOVEMENTS, THIRD TRIMESTER, NOT APPLICABLE OR UNSPECIFIED: ICD-10-CM

## 2024-05-29 DIAGNOSIS — Z3A.34 34 WEEKS GESTATION OF PREGNANCY: ICD-10-CM

## 2024-05-29 DIAGNOSIS — O99.513 DISEASES OF THE RESPIRATORY SYSTEM COMPLICATING PREGNANCY, THIRD TRIMESTER: ICD-10-CM

## 2024-05-29 DIAGNOSIS — J10.1 INFLUENZA DUE TO OTHER IDENTIFIED INFLUENZA VIRUS WITH OTHER RESPIRATORY MANIFESTATIONS: ICD-10-CM

## 2024-05-29 DIAGNOSIS — O99.013 ANEMIA COMPLICATING PREGNANCY, THIRD TRIMESTER: ICD-10-CM

## 2024-05-29 DIAGNOSIS — J45.909 UNSPECIFIED ASTHMA, UNCOMPLICATED: ICD-10-CM

## 2024-06-04 PROBLEM — J45.909 UNSPECIFIED ASTHMA, UNCOMPLICATED: Chronic | Status: ACTIVE | Noted: 2024-05-27

## 2024-06-07 DIAGNOSIS — O99.019 ANEMIA COMPLICATING PREGNANCY, UNSPECIFIED TRIMESTER: ICD-10-CM

## 2024-06-07 DIAGNOSIS — O28.0 ABNORMAL HEMATOLOGICAL FINDING ON ANTENATAL SCREENING OF MOTHER: ICD-10-CM

## 2024-06-07 DIAGNOSIS — O09.899 SUPERVISION OF OTHER HIGH RISK PREGNANCIES, UNSPECIFIED TRIMESTER: ICD-10-CM

## 2024-06-10 ENCOUNTER — ASOB RESULT (OUTPATIENT)
Age: 19
End: 2024-06-10

## 2024-06-10 ENCOUNTER — APPOINTMENT (OUTPATIENT)
Dept: ANTEPARTUM | Facility: CLINIC | Age: 19
End: 2024-06-10

## 2024-06-10 ENCOUNTER — APPOINTMENT (OUTPATIENT)
Dept: ANTEPARTUM | Facility: CLINIC | Age: 19
End: 2024-06-10
Payer: MEDICAID

## 2024-06-10 ENCOUNTER — APPOINTMENT (OUTPATIENT)
Dept: MATERNAL FETAL MEDICINE | Facility: CLINIC | Age: 19
End: 2024-06-10

## 2024-06-10 ENCOUNTER — APPOINTMENT (OUTPATIENT)
Dept: MATERNAL FETAL MEDICINE | Facility: CLINIC | Age: 19
End: 2024-06-10
Payer: MEDICAID

## 2024-06-10 ENCOUNTER — NON-APPOINTMENT (OUTPATIENT)
Age: 19
End: 2024-06-10

## 2024-06-10 VITALS — DIASTOLIC BLOOD PRESSURE: 68 MMHG | SYSTOLIC BLOOD PRESSURE: 98 MMHG | HEIGHT: 60 IN

## 2024-06-10 VITALS — HEART RATE: 86 BPM | BODY MASS INDEX: 22.66 KG/M2 | WEIGHT: 116 LBS | RESPIRATION RATE: 18 BRPM

## 2024-06-10 VITALS — BODY MASS INDEX: 22.78 KG/M2 | HEART RATE: 86 BPM | RESPIRATION RATE: 16 BRPM | HEIGHT: 60 IN | WEIGHT: 116 LBS

## 2024-06-10 DIAGNOSIS — Z3A.36 36 WEEKS GESTATION OF PREGNANCY: ICD-10-CM

## 2024-06-10 DIAGNOSIS — Z86.59 PERSONAL HISTORY OF OTHER MENTAL AND BEHAVIORAL DISORDERS: ICD-10-CM

## 2024-06-10 DIAGNOSIS — F12.90 CANNABIS USE, UNSPECIFIED, UNCOMPLICATED: ICD-10-CM

## 2024-06-10 DIAGNOSIS — O99.320 DRUG USE COMPLICATING PREGNANCY, UNSPECIFIED TRIMESTER: ICD-10-CM

## 2024-06-10 DIAGNOSIS — O99.013 ANEMIA COMPLICATING PREGNANCY, THIRD TRIMESTER: ICD-10-CM

## 2024-06-10 DIAGNOSIS — O36.5930 MATERNAL CARE FOR OTHER KNOWN OR SUSPECTED POOR FETAL GROWTH, THIRD TRIMESTER, NOT APPLICABLE OR UNSPECIFIED: ICD-10-CM

## 2024-06-10 DIAGNOSIS — Z86.19 PERSONAL HISTORY OF OTHER INFECTIOUS AND PARASITIC DISEASES: ICD-10-CM

## 2024-06-10 DIAGNOSIS — Z87.39 PERSONAL HISTORY OF OTHER DISEASES OF THE MUSCULOSKELETAL SYSTEM AND CONNECTIVE TISSUE: ICD-10-CM

## 2024-06-10 DIAGNOSIS — O36.5990 MATERNAL CARE FOR OTHER KNOWN OR SUSPECTED POOR FETAL GROWTH, UNSPECIFIED TRIMESTER, NOT APPLICABLE OR UNSPECIFIED: ICD-10-CM

## 2024-06-10 DIAGNOSIS — F12.90 DRUG USE COMPLICATING PREGNANCY, UNSPECIFIED TRIMESTER: ICD-10-CM

## 2024-06-10 DIAGNOSIS — O99.330 SMOKING (TOBACCO) COMPLICATING PREGNANCY, UNSPECIFIED TRIMESTER: ICD-10-CM

## 2024-06-10 DIAGNOSIS — O34.219 MATERNAL CARE FOR UNSPECIFIED TYPE SCAR FROM PREVIOUS CESAREAN DELIVERY: ICD-10-CM

## 2024-06-10 DIAGNOSIS — O09.33 SUPERVISION OF PREGNANCY WITH INSUFFICIENT ANTENATAL CARE, THIRD TRIMESTER: ICD-10-CM

## 2024-06-10 DIAGNOSIS — Z64.1 PROBLEMS RELATED TO MULTIPARITY: ICD-10-CM

## 2024-06-10 PROCEDURE — 76818 FETAL BIOPHYS PROFILE W/NST: CPT

## 2024-06-10 PROCEDURE — 99215 OFFICE O/P EST HI 40 MIN: CPT

## 2024-06-10 PROCEDURE — 76820 UMBILICAL ARTERY ECHO: CPT | Mod: 59

## 2024-06-10 PROCEDURE — 76816 OB US FOLLOW-UP PER FETUS: CPT

## 2024-06-10 NOTE — OB HISTORY
[Approximately ___ (Month)] : the LMP was approximately [unfilled] month(s) ago [Pregnancy History] : girl [___] : Delivery occurred at [unfilled] [Spontaneous] : Spontaneous conception [Sonogram] : sonogram [at ___ wks] : at [unfilled] weeks [Normal Amount/Duration] : was abnormal [Spotting Between  Menses] : no spotting between menses [Regular Cycle Intervals] : periods have been irregular [FreeTextEntry1] : First prenatal visit was on 5/22/2024.

## 2024-06-10 NOTE — DISCUSSION/SUMMARY
[FreeTextEntry1] : She is 36 weeks and 4 days gestation by ultrasound dating.  Patient has had insufficient prenatal care.  Pregnancy is dated by an ultrasound done at [facility] on 2024.  The SUNG is 2024.  She had an ultrasound examination at [facility] on 2024 that reported the estimated fetal weight to be at the 7th percentile for gestational age.  The fetal abdominal circumference measurement was at the 6th percentile for gestational age.  These findings were suggestive of fetal growth restriction (FGR).  The umbilical artery Doppler velocimetry was within normal limits.  Today, patient had an ultrasound examination that reported the estimated fetal weight to be 4 pounds 12 ounces which is at the 2nd percentile for gestational age.  The fetal abdominal circumference measurement was less than the 1st percentile for the gestational age.  These findings are consistent with severe fetal growth restriction.  The amniotic fluid volume was normal. The umbilical artery S/D ratio was normal for gestational age. The middle cerebral artery Doppler study has increased diastolic velocity. There was fetal brain sparing based on the normal MCA:UA ratio. The left uterine artery Doppler PI was elevated above the 95% for gestational age with absent notching in the velocity waveform. This finding is suggestive of reduced perfusion of the maternal utero-placental circulation. I told her that suboptimal perfusion of the maternal placental circulation is the most common cause of fetal growth restriction and accounts for 30% to 40% of all cases. The fetus was found to be healthy with a normal biophysical profile score (8/10). At this time there is no evidence of severe placental insufficiency in view of normal amniotic fluid volume and normal umbilical artery Doppler studies.   This pregnancy has also been complicated by maternal anemia and marijuana smoking which have been associated with fetal growth restriction. I told her that many babies that are suspected to have fetal growth restriction are constitutionally small or healthy small babies.  I told her that fetal growth restriction has been associated with an increased risk for having adverse  outcomes such as intrauterine demise,  death, and  morbidity such as: hypoglycemia, hyperbilirubinemia, hypothermia, intraventricular hemorrhage, necrotizing enterocolitis, seizures, sepsis, respiratory distress syndrome, and  death.  I recommend twice weekly fetal surveillance with BPP and Doppler studies of the umbilical artery, middle cerebral artery, and uterine artery until delivery. I also recommend delivery at 37 weeks of gestation due to the severe FGR (ACOG Committee Opinion No. 818, 2021).   She had 2  section deliveries, and she was advised to have a repeat  section delivery.  I told her that multiple repeat  deliveries can result in serious maternal morbidity.  I told her that she is at risk for dense adhesions, more difficult surgery, placenta accreta, bowel injury, cystotomy, ureteral injury, ileus, hysterectomy, blood transfusions, intensive care unit admission, and  delivery.  She is multiparous and we discussed the various methods available for contraception after delivery. She told me that she wants to have an operative sterilization procedure after the repeat  delivery.

## 2024-06-12 ENCOUNTER — OUTPATIENT (OUTPATIENT)
Dept: INPATIENT UNIT | Facility: HOSPITAL | Age: 19
LOS: 1 days | End: 2024-06-12
Payer: MEDICAID

## 2024-06-12 ENCOUNTER — TRANSCRIPTION ENCOUNTER (OUTPATIENT)
Age: 19
End: 2024-06-12

## 2024-06-12 VITALS — DIASTOLIC BLOOD PRESSURE: 85 MMHG | SYSTOLIC BLOOD PRESSURE: 124 MMHG | HEART RATE: 65 BPM

## 2024-06-12 VITALS — HEART RATE: 71 BPM | SYSTOLIC BLOOD PRESSURE: 119 MMHG | DIASTOLIC BLOOD PRESSURE: 60 MMHG

## 2024-06-12 DIAGNOSIS — Z98.890 OTHER SPECIFIED POSTPROCEDURAL STATES: Chronic | ICD-10-CM

## 2024-06-12 DIAGNOSIS — Z98.891 HISTORY OF UTERINE SCAR FROM PREVIOUS SURGERY: Chronic | ICD-10-CM

## 2024-06-12 DIAGNOSIS — O26.899 OTHER SPECIFIED PREGNANCY RELATED CONDITIONS, UNSPECIFIED TRIMESTER: ICD-10-CM

## 2024-06-12 PROCEDURE — 59025 FETAL NON-STRESS TEST: CPT

## 2024-06-12 PROCEDURE — G0463: CPT

## 2024-06-12 NOTE — OB PROVIDER TRIAGE NOTE - NSHPPHYSICALEXAM_GEN_ALL_CORE
Vital Signs Last 24 Hrs  T(C): 36.7 (12 Jun 2024 19:23), Max: 36.7 (12 Jun 2024 19:23)  T(F): 98.1 (12 Jun 2024 19:23), Max: 98.1 (12 Jun 2024 19:23)  HR: 65 (12 Jun 2024 21:02) (65 - 71)  BP: 124/85 (12 Jun 2024 21:02) (119/60 - 124/85)  RR: 17 (12 Jun 2024 19:23) (17 - 17)    Parameters below as of 12 Jun 2024 19:23  Patient On (Oxygen Delivery Method): room air    Gen: well-appearing, NAD  Neuro: A&Ox3  Resp: breathing comfortably on RA   Abd: nontender, gravid   VE: clp, no pooling on spec exam, physiologic discharge present  FHT: baseline 130, mod variability, -accels, -decels  [place]: irregular ctx

## 2024-06-12 NOTE — OB RN TRIAGE NOTE - NSDCTEMPSITE_OBGYN_A_OB
[FreeTextEntry1] : JERMAINE     Is here today because on a recent exam by the pediatrician, they were noted to have mild to moderate asymmetry in their back. The pediatrician ordered an xray and referred them to see pediatric orthopaedics. \par \par Has used a brace for treatment: No\par \par \par They deny any history of pain and fever, any history of numbness or tingling. Any history of change in bladder or bowel function. No history of weakness and denies any history of bug or tick bites or rashes.\par \par No family history of scoliosis\par \par See below for past medical/surgical history\par 
oral

## 2024-06-12 NOTE — OB RN TRIAGE NOTE - HOW OFTEN DO YOU HAVE A DRINK CONTAINING ALCOHOL?
Problem: OCCUPATIONAL THERAPY ADULT  Goal: Performs self-care activities at highest level of function for planned discharge setting. See evaluation for individualized goals. Description:   Outcome: Progressing  Note: Limitation: Decreased ADL status, Decreased UE ROM, Decreased UE strength, Decreased Safe judgement during ADL, Decreased cognition, Decreased endurance, Decreased self-care trans, Decreased high-level ADLs  Prognosis: Good  Assessment: Patient participated in Skilled OT session this date with interventions consisting of  functional transfers, functional mobility*,  therapeutic activities to: increase activity tolerance and increase dynamic sit/ stand balance during functional activity  . Patient agreeable to OT treatment session, upon arrival patient was found seated at edge of bed. Treatment session as follows: Pt declines ADL needs. Pt requires SPV/CGA w RW for transfers & ambulation in room. Pt maintains O2 >85% with all activity. Pt also limited by cognition. Patient continues to be functioning below baseline level, occupational performance remains limited secondary to factors listed above and increased risk for falls and injury. The patient's raw score on the -PAC Daily Activity inpatient short form is 17, standardized score is 37.26, less than 39.4. Patients at this level are likely to benefit from DC to post-acute rehabilitation services. From OT standpoint, recommendation at time of d/c would be level 2. Patient to benefit from continued Occupational Therapy treatment while in the hospital to address deficits as defined above and maximize level of functional independence with ADLs and functional mobility. Pt seen as a co-tx with PT due to the patient's co-morbidities, clinically unstable presentation, and present impairments which are a regression from the patient's baseline. Pt left with call bell in reach, tray table in reach, needs met, chair alarm activated, SCDs on. Never

## 2024-06-12 NOTE — OB PROVIDER TRIAGE NOTE - NSOBPROVIDERNOTE_OBGYN_ALL_OB_FT
19y  @36.6w GA evaluated for leakage of fluid.    - membranes intact: no pooling on speculum exam, RHIANNA 6.59cc, clp on digital exam  - bedside sono w BPP 8/10, FHT w mod variability and no decels  - to return tomorrow for scheduled rCS  - clear for dc, return precautions reviewed    discussed with Dr. Lemos

## 2024-06-12 NOTE — OB PROVIDER TRIAGE NOTE - HISTORY OF PRESENT ILLNESS
19y  @36.6w GA was sent in for evaluation of leakage of fluid. Pt states that she's been experiencing intermittent loss of fluid since 1000 this morning. Denies contractions, bright red bleeding, and decreased fetal movement.     Pregnancy course:  fetal growth restriction <2%ile, rec delivery @37w GA  prior CSx2  marijuana use in pregnancy    POB: CSx2, G1: previa at 35 wks. G2 rCS uncomplicated  PGYN: -fibroids/-cysts, denied STD hx, denies abnormal PAPs  PMH: Anemia  PSH: CSx2, hernia repair  SH: Uses marijuana during pregnancy  Meds: PNV  All: NKDA   7

## 2024-06-12 NOTE — OB RN TRIAGE NOTE - NSNURSINGINSTR_OBGYN_ALL_OB_FT
Patient given discharge instructions and education provided. Patient to return tomorrow for scheduled c/s. Patient states understanding. All questions answered.

## 2024-06-13 ENCOUNTER — TRANSCRIPTION ENCOUNTER (OUTPATIENT)
Age: 19
End: 2024-06-13

## 2024-06-13 ENCOUNTER — APPOINTMENT (OUTPATIENT)
Dept: ANTEPARTUM | Facility: CLINIC | Age: 19
End: 2024-06-13

## 2024-06-13 ENCOUNTER — INPATIENT (INPATIENT)
Facility: HOSPITAL | Age: 19
LOS: 0 days | Discharge: HOME | End: 2024-06-14
Attending: OBSTETRICS & GYNECOLOGY | Admitting: OBSTETRICS & GYNECOLOGY
Payer: MEDICAID

## 2024-06-13 VITALS
HEIGHT: 60 IN | SYSTOLIC BLOOD PRESSURE: 112 MMHG | WEIGHT: 115.08 LBS | TEMPERATURE: 98 F | DIASTOLIC BLOOD PRESSURE: 77 MMHG | HEART RATE: 58 BPM | RESPIRATION RATE: 18 BRPM

## 2024-06-13 DIAGNOSIS — D64.9 ANEMIA, UNSPECIFIED: ICD-10-CM

## 2024-06-13 DIAGNOSIS — F12.90 CANNABIS USE, UNSPECIFIED, UNCOMPLICATED: ICD-10-CM

## 2024-06-13 DIAGNOSIS — O36.599: ICD-10-CM

## 2024-06-13 DIAGNOSIS — Z98.890 OTHER SPECIFIED POSTPROCEDURAL STATES: Chronic | ICD-10-CM

## 2024-06-13 DIAGNOSIS — O99.513 DISEASES OF THE RESPIRATORY SYSTEM COMPLICATING PREGNANCY, THIRD TRIMESTER: ICD-10-CM

## 2024-06-13 DIAGNOSIS — Z3A.36 36 WEEKS GESTATION OF PREGNANCY: ICD-10-CM

## 2024-06-13 DIAGNOSIS — O99.013 ANEMIA COMPLICATING PREGNANCY, THIRD TRIMESTER: ICD-10-CM

## 2024-06-13 DIAGNOSIS — O34.219 MATERNAL CARE FOR UNSPECIFIED TYPE SCAR FROM PREVIOUS CESAREAN DELIVERY: ICD-10-CM

## 2024-06-13 DIAGNOSIS — Z87.59 PERSONAL HISTORY OF OTHER COMPLICATIONS OF PREGNANCY, CHILDBIRTH AND THE PUERPERIUM: ICD-10-CM

## 2024-06-13 DIAGNOSIS — Z03.71 ENCOUNTER FOR SUSPECTED PROBLEM WITH AMNIOTIC CAVITY AND MEMBRANE RULED OUT: ICD-10-CM

## 2024-06-13 DIAGNOSIS — Z98.891 HISTORY OF UTERINE SCAR FROM PREVIOUS SURGERY: Chronic | ICD-10-CM

## 2024-06-13 DIAGNOSIS — O99.323 DRUG USE COMPLICATING PREGNANCY, THIRD TRIMESTER: ICD-10-CM

## 2024-06-13 DIAGNOSIS — J45.909 UNSPECIFIED ASTHMA, UNCOMPLICATED: ICD-10-CM

## 2024-06-13 LAB
BASOPHILS # BLD AUTO: 0.02 K/UL — SIGNIFICANT CHANGE UP (ref 0–0.2)
BASOPHILS NFR BLD AUTO: 0.5 % — SIGNIFICANT CHANGE UP (ref 0–2)
BLD GP AB SCN SERPL QL: SIGNIFICANT CHANGE UP
EOSINOPHIL # BLD AUTO: 0.02 K/UL — SIGNIFICANT CHANGE UP (ref 0–0.5)
EOSINOPHIL NFR BLD AUTO: 0.5 % — SIGNIFICANT CHANGE UP (ref 0–6)
HCT VFR BLD CALC: 38.3 % — SIGNIFICANT CHANGE UP (ref 34.5–45)
HGB BLD-MCNC: 12.7 G/DL — SIGNIFICANT CHANGE UP (ref 11.5–15.5)
HIV 1 & 2 AB SERPL IA.RAPID: SIGNIFICANT CHANGE UP
IMM GRANULOCYTES NFR BLD AUTO: 0 % — SIGNIFICANT CHANGE UP (ref 0–0.9)
LYMPHOCYTES # BLD AUTO: 1.34 K/UL — SIGNIFICANT CHANGE UP (ref 1–3.3)
LYMPHOCYTES # BLD AUTO: 31.4 % — SIGNIFICANT CHANGE UP (ref 13–44)
MCHC RBC-ENTMCNC: 29.7 PG — SIGNIFICANT CHANGE UP (ref 27–34)
MCHC RBC-ENTMCNC: 33.2 GM/DL — SIGNIFICANT CHANGE UP (ref 32–36)
MCV RBC AUTO: 89.5 FL — SIGNIFICANT CHANGE UP (ref 80–100)
MONOCYTES # BLD AUTO: 0.3 K/UL — SIGNIFICANT CHANGE UP (ref 0–0.9)
MONOCYTES NFR BLD AUTO: 7 % — SIGNIFICANT CHANGE UP (ref 2–14)
NEUTROPHILS # BLD AUTO: 2.59 K/UL — SIGNIFICANT CHANGE UP (ref 1.8–7.4)
NEUTROPHILS NFR BLD AUTO: 60.6 % — SIGNIFICANT CHANGE UP (ref 43–77)
PLATELET # BLD AUTO: 236 K/UL — SIGNIFICANT CHANGE UP (ref 150–400)
RBC # BLD: 4.28 M/UL — SIGNIFICANT CHANGE UP (ref 3.8–5.2)
RBC # FLD: 13.6 % — SIGNIFICANT CHANGE UP (ref 10.3–14.5)
WBC # BLD: 4.27 K/UL — SIGNIFICANT CHANGE UP (ref 3.8–10.5)
WBC # FLD AUTO: 4.27 K/UL — SIGNIFICANT CHANGE UP (ref 3.8–10.5)

## 2024-06-13 PROCEDURE — 99222 1ST HOSP IP/OBS MODERATE 55: CPT

## 2024-06-13 PROCEDURE — 88307 TISSUE EXAM BY PATHOLOGIST: CPT | Mod: 26

## 2024-06-13 PROCEDURE — 93010 ELECTROCARDIOGRAM REPORT: CPT

## 2024-06-13 RX ORDER — SODIUM CHLORIDE 9 MG/ML
1000 INJECTION, SOLUTION INTRAVENOUS
Refills: 0 | Status: DISCONTINUED | OUTPATIENT
Start: 2024-06-13 | End: 2024-06-13

## 2024-06-13 RX ORDER — CEFAZOLIN SODIUM 1 G
2000 VIAL (EA) INJECTION ONCE
Refills: 0 | Status: COMPLETED | OUTPATIENT
Start: 2024-06-13 | End: 2024-06-13

## 2024-06-13 RX ORDER — SIMETHICONE 80 MG/1
80 TABLET, CHEWABLE ORAL EVERY 4 HOURS
Refills: 0 | Status: DISCONTINUED | OUTPATIENT
Start: 2024-06-13 | End: 2024-06-14

## 2024-06-13 RX ORDER — LANOLIN
1 OINTMENT (GRAM) TOPICAL EVERY 6 HOURS
Refills: 0 | Status: DISCONTINUED | OUTPATIENT
Start: 2024-06-13 | End: 2024-06-14

## 2024-06-13 RX ORDER — SODIUM CHLORIDE 9 MG/ML
1000 INJECTION, SOLUTION INTRAVENOUS
Refills: 0 | Status: DISCONTINUED | OUTPATIENT
Start: 2024-06-13 | End: 2024-06-14

## 2024-06-13 RX ORDER — FAMOTIDINE 10 MG/ML
20 INJECTION INTRAVENOUS ONCE
Refills: 0 | Status: COMPLETED | OUTPATIENT
Start: 2024-06-13 | End: 2024-06-13

## 2024-06-13 RX ORDER — KETOROLAC TROMETHAMINE 30 MG/ML
30 SYRINGE (ML) INJECTION EVERY 6 HOURS
Refills: 0 | Status: DISCONTINUED | OUTPATIENT
Start: 2024-06-13 | End: 2024-06-14

## 2024-06-13 RX ORDER — DIPHENHYDRAMINE HCL 50 MG
25 CAPSULE ORAL EVERY 6 HOURS
Refills: 0 | Status: DISCONTINUED | OUTPATIENT
Start: 2024-06-13 | End: 2024-06-14

## 2024-06-13 RX ORDER — OXYTOCIN 10 UNIT/ML
333.33 VIAL (ML) INJECTION
Qty: 20 | Refills: 0 | Status: DISCONTINUED | OUTPATIENT
Start: 2024-06-13 | End: 2024-06-14

## 2024-06-13 RX ORDER — ACETAMINOPHEN 500 MG
975 TABLET ORAL
Refills: 0 | Status: DISCONTINUED | OUTPATIENT
Start: 2024-06-13 | End: 2024-06-14

## 2024-06-13 RX ORDER — DIPHENHYDRAMINE HCL 50 MG
25 CAPSULE ORAL EVERY 6 HOURS
Refills: 0 | Status: COMPLETED | OUTPATIENT
Start: 2024-06-13 | End: 2025-05-12

## 2024-06-13 RX ORDER — TRANEXAMIC ACID 100 MG/ML
1000 INJECTION, SOLUTION INTRAVENOUS ONCE
Refills: 0 | Status: COMPLETED | OUTPATIENT
Start: 2024-06-13 | End: 2024-06-13

## 2024-06-13 RX ORDER — IBUPROFEN 200 MG
600 TABLET ORAL EVERY 6 HOURS
Refills: 0 | Status: COMPLETED | OUTPATIENT
Start: 2024-06-13 | End: 2025-05-12

## 2024-06-13 RX ORDER — ENOXAPARIN SODIUM 100 MG/ML
40 INJECTION SUBCUTANEOUS EVERY 24 HOURS
Refills: 0 | Status: DISCONTINUED | OUTPATIENT
Start: 2024-06-14 | End: 2024-06-14

## 2024-06-13 RX ORDER — CHLORHEXIDINE GLUCONATE 213 G/1000ML
1 SOLUTION TOPICAL DAILY
Refills: 0 | Status: DISCONTINUED | OUTPATIENT
Start: 2024-06-13 | End: 2024-06-13

## 2024-06-13 RX ORDER — SCOPALAMINE 1 MG/3D
1 PATCH, EXTENDED RELEASE TRANSDERMAL ONCE
Refills: 0 | Status: COMPLETED | OUTPATIENT
Start: 2024-06-13 | End: 2024-06-13

## 2024-06-13 RX ORDER — OXYCODONE HYDROCHLORIDE 5 MG/1
5 TABLET ORAL
Refills: 0 | Status: COMPLETED | OUTPATIENT
Start: 2024-06-13 | End: 2024-06-20

## 2024-06-13 RX ORDER — OXYCODONE HYDROCHLORIDE 5 MG/1
5 TABLET ORAL ONCE
Refills: 0 | Status: DISCONTINUED | OUTPATIENT
Start: 2024-06-13 | End: 2024-06-14

## 2024-06-13 RX ORDER — ACETAMINOPHEN 500 MG
975 TABLET ORAL ONCE
Refills: 0 | Status: COMPLETED | OUTPATIENT
Start: 2024-06-13 | End: 2024-06-13

## 2024-06-13 RX ORDER — ETONOGESTREL 68 MG/1
68 IMPLANT SUBCUTANEOUS ONCE
Refills: 0 | Status: DISCONTINUED | OUTPATIENT
Start: 2024-06-13 | End: 2024-06-14

## 2024-06-13 RX ORDER — CITRIC ACID/SODIUM CITRATE 300-500 MG
30 SOLUTION, ORAL ORAL ONCE
Refills: 0 | Status: COMPLETED | OUTPATIENT
Start: 2024-06-13 | End: 2024-06-13

## 2024-06-13 RX ORDER — TETANUS TOXOID, REDUCED DIPHTHERIA TOXOID AND ACELLULAR PERTUSSIS VACCINE, ADSORBED 5; 2.5; 8; 8; 2.5 [IU]/.5ML; [IU]/.5ML; UG/.5ML; UG/.5ML; UG/.5ML
0.5 SUSPENSION INTRAMUSCULAR ONCE
Refills: 0 | Status: DISCONTINUED | OUTPATIENT
Start: 2024-06-13 | End: 2024-06-14

## 2024-06-13 RX ORDER — MAGNESIUM HYDROXIDE 400 MG/1
30 TABLET, CHEWABLE ORAL
Refills: 0 | Status: DISCONTINUED | OUTPATIENT
Start: 2024-06-13 | End: 2024-06-14

## 2024-06-13 RX ADMIN — SODIUM CHLORIDE 200 MILLILITER(S): 9 INJECTION, SOLUTION INTRAVENOUS at 10:38

## 2024-06-13 RX ADMIN — Medication 975 MILLIGRAM(S): at 11:02

## 2024-06-13 RX ADMIN — Medication 2000 MILLIGRAM(S): at 12:32

## 2024-06-13 RX ADMIN — FAMOTIDINE 20 MILLIGRAM(S): 10 INJECTION INTRAVENOUS at 11:03

## 2024-06-13 RX ADMIN — Medication 30 MILLILITER(S): at 11:02

## 2024-06-13 RX ADMIN — SCOPALAMINE 1 PATCH: 1 PATCH, EXTENDED RELEASE TRANSDERMAL at 11:04

## 2024-06-13 RX ADMIN — CHLORHEXIDINE GLUCONATE 1 APPLICATION(S): 213 SOLUTION TOPICAL at 11:02

## 2024-06-13 RX ADMIN — TRANEXAMIC ACID 220 MILLIGRAM(S): 100 INJECTION, SOLUTION INTRAVENOUS at 13:00

## 2024-06-13 RX ADMIN — Medication 30 MILLIGRAM(S): at 13:20

## 2024-06-13 NOTE — OB PROVIDER DELIVERY SUMMARY - NSPROVIDERDELIVERYNOTE_OBGYN_ALL_OB_FT
Pt taken to the OR for repeat C/S at 37w due to severe FGR EFW 2%tile AC 1<tile.  Spinal anesthesia.  Low segment transverse  section was performed.  Delivered live male infant, vtx, through moderate amount of clear amniotic fluid.  No nuchal cord.  Adhesions noted from omentum to anterior abdominal wall. SHANNON noted to be thin.  Bilateral fallopian tubes and ovaries WNL.   Hysterotomy was reapproximated with 0- vicryl suture, excellent hemostasis obtained.  Peritoneum and rectus muscle noted to be hemostatic. Fascia were reapproximated with 1-vicryl suture, excellent hemostasis obtained.  SubQ tissue reapproximated with 2-0 plain gut suture.  Skin closed with 3-0Monocryl on Demetrius needle.  Attention was then turned to patient left arm. Skin was prepped with betadine. 2cc of local anesthetic injected 5cm superior to the medial epicondyle and 2cm inferior to the sulcus. Small incision was made with a scalpel. Nexplanon was inserted at this location. Steri strip applied to incision and pressure dressing applied.     weight 2030g, 4lbs 7oz.  Skin-to-skin initiated in OR and continued into RR.  APGAR 9/9.   QBL: 497  IVF:   Uop:  No intraoperative complications Pt taken to the OR for repeat C/S at 37w due to severe FGR EFW 2%tile AC 1<tile.  Spinal anesthesia.  Low segment transverse  section was performed.  Delivered live male infant, vtx, through moderate amount of clear amniotic fluid.  No nuchal cord.  Adhesions noted from omentum to anterior abdominal wall. SHANN[place] noted to be thin.  Bilateral fallopian tubes and ovaries WNL.   Hysterotomy was reapproximated with 0- vicryl suture, excellent hemostasis obtained.  Peritoneum and rectus muscle noted to be hemostatic. Fascia were reapproximated with 1-vicryl suture, excellent hemostasis obtained.  SubQ tissue reapproximated with 2-0 plain gut suture.  Skin closed with 3-0Monocryl on Demetrius needle.  Attention was then turned to patient left arm. Skin was prepped with betadine. 2cc of local anesthetic injected 5cm superior to the medial epicondyle and 2cm inferior to the sulcus. Small incision was made with a scalpel. Nexplanon was inserted at this location. Steri strip applied to incision and pressure dressing applied.     weight 2030g, 4lbs 7oz.  Skin-to-skin initiated in OR and continued into RR.  APGAR 9/9.   QBL: 497  IVF: 1400  Uop:100  No intraoperative complications

## 2024-06-13 NOTE — DISCHARGE NOTE OB - CARE PROVIDER_API CALL
Ange Bradford  Obstetrics and Gynecology  [facility] [place] [address]  Phone: [phone]  Fax: [phone]  Follow Up Time: 2 weeks

## 2024-06-13 NOTE — DISCHARGE NOTE OB - PATIENT PORTAL LINK FT
You can access the FollowMyHealth Patient Portal offered by [facility] by registering at the following website: [email or web address]. By joining [facility]’s FollowMyHealth portal, you will also be able to view your health information using other applications (apps) compatible with our system.

## 2024-06-13 NOTE — OB PROVIDER H&P - HISTORY OF PRESENT ILLNESS
19y  at 37 weeks GA who presents to L&D for rCS due to IUGR at 37w. Patient denies vaginal bleeding, contractions and leakage of fluid. She endorses good fetal movement. Denies fevers, chills, nausea, vomiting, chest pain, SOB, dizziness and headache. No other complaints at this time.     SUNG: 2024  LMP: 10/10/2023    Prenatal course is significant for:  IUGR, EFW 2%tile, AC <1%tile  Marijuana use in pregnancy  Anemia in pregnancy   CSx2    POB: CSx2, G1: previa at 35 wks. G2 rCS uncomplicated  PGYN: -fibroids/-cysts, denied STD hx, denies abnormal PAPs  PMH: Anemia  PSH: CSx2, hernia repair  SH: Uses marijuana during pregnancy  Meds: PNV  All: NKDA

## 2024-06-13 NOTE — OB PROVIDER H&P - ATTENDING COMMENTS
19y  at 37 weeks GA  here for scheduled repeat c section due to FGR AC 1%ile, EFW 2% ile  pt has prior hx of c sec x 2  no complaints today   accepts blood transfusion   pt was counselled regarding the risk of c section including the risk of hemorrhage, need for blood transfusion, risk of injury to bowel or bladder. pt was informed that if the organ injury is recognized at the time of c section then the specialist with consulted, however there is a risk of injury not recognized at the time of c section and that may require return to OR. pt was counselled on the risk of placenta accreta and possible hysterectomy.   pt wants nexplanon for birth control. risks and benefits discussed  consent obtained  plan   DVT ppx  cross match 2 Units PRBC  TXA 1gm

## 2024-06-13 NOTE — OB PROVIDER DELIVERY SUMMARY - NSSELHIDDEN_OBGYN_ALL_OB_FT
[NS_DeliveryAttending1_OBGYN_ALL_OB_FT:[id number]],[NS_DeliveryAssist1_OBGYN_ALL_OB_FT:[id number]_DeliveryAssist2_OBGYN_ALL_OB_FT:[id number]],[NS_DeliveryRN_OBGYN_ALL_OB_FT:[id number]]

## 2024-06-13 NOTE — OB PROVIDER H&P - NSHPPHYSICALEXAM_GEN_ALL_CORE
T(C): 36.6 (06-13-24 @ 10:13), Max: 36.7 (06-12-24 @ 19:23)  HR: 58 (06-13-24 @ 10:15) (58 - 71)  BP: 112/77 (06-13-24 @ 10:15) (112/77 - 124/85)  RR: 18 (06-13-24 @ 10:13) (17 - 18)    Gen: NAD, well-appearing, AAOx3   Abd: Soft, gravid  Ext: non-tender, non-edematous    Bedside sono: vertex, anterior placenta  FHT: baseline FHR 125s, moderate variability, +accels, -decels  [place]: irregular infrequent contractions

## 2024-06-13 NOTE — OB RN DELIVERY SUMMARY - NSSELHIDDEN_OBGYN_ALL_OB_FT
[NS_DeliveryAttending1_OBGYN_ALL_OB_FT:[id number]],[NS_DeliveryAssist1_OBGYN_ALL_OB_FT:[id number]],[NS_DeliveryAssist2_OBGYN_ALL_OB_FT:[id number]=],[NS_DeliveryRN_OBGYN_ALL_OB_FT:[id number]=]

## 2024-06-13 NOTE — CONSULT NOTE ADULT - ASSESSMENT
IS   19y  at 37 weeks GA who presents to L&D for rCS due to IUGR at 37w, she under went C section and post op she was bradycardiac to low 40s, she got Precedex and some other anesthesia medication and she became some bradycardiac after getting those, she denies any chest pain, palpitation, sob, dizziness, nausea, vomiting, her HR is high forties now. she has no fever, chills, chest pain, sob, medicine team consulted for evaluation of bradycardia.      Plan:     Sinus Bradycardia:   Likely from Anesthesia meds   EKG reviewed  HR is coming up slowly   will continue to monitor   will get Utox given hx of Drugs abuse     C section:   Management as per Primary team  IS   Stefani amaral   Bowel meds   DVT prophylaxis as per Primary team.    multivitamins  wound care

## 2024-06-13 NOTE — OB RN DELIVERY SUMMARY - NS_SEPSISRSKCALC_OBGYN_ALL_OB_FT
EOS calculated successfully. EOS Risk Factor: 0.5/1000 live births ([facility][note 3] national incidence); GA=37w;Temp=97.9; ROM=0; GBS='Positive'; Antibiotics='No antibiotics or any antibiotics < 2 hrs prior to birth'

## 2024-06-13 NOTE — OB PROVIDER H&P - ASSESSMENT
A/P:   -Admit to L&D for rCS at 37w due to IUGR EFW 2%tile and AC <1%tile.  -Consent  -Admission labs  -Fetus: Cat I tracing. Continuous toco and fetal monitoring.   -GBS: Positive, no GBS ppx required for   -Analgesia: spinal for     Discussed with Dr. Bradford

## 2024-06-13 NOTE — OB RN INTRAOPERATIVE NOTE - NSSELHIDDEN_OBGYN_ALL_OB_FT
[NS_DeliveryAttending1_OBGYN_ALL_OB_FT:[id number]],[NS_DeliveryAssist1_OBGYN_ALL_OB_FT:[id number]],[NS_DeliveryAssist2_OBGYN_ALL_OB_FT:[id number]=],[NS_DeliveryRN_OBGYN_ALL_OB_FT:[id number]]

## 2024-06-13 NOTE — CONSULT NOTE ADULT - SUBJECTIVE AND OBJECTIVE BOX
19y  at 37 weeks GA who presents to L&D for rCS due to IUGR at 37w, she under went C section and post op she was bradycardiac to low 40s, she got Precedex and some other anesthesia medication and she became some bradycardiac after getting those, she denies any chest pain, palpitation, sob, dizziness, nausea, vomiting, her HR is high forties now. she has no fever, chills, chest pain, sob, medicine team consulted for evaluation of bradycardia.      Prenatal course is significant for:  IUGR, EFW 2%tile, AC <1%tile  Marijuana use in pregnancy  Anemia in pregnancy   CSx2    POB: CSx2, G1: previa at 35 wks. G2 rCS uncomplicated  PGYN: -fibroids/-cysts, denied STD hx, denies abnormal PAPs  PMH: Anemia  PSH: CSx2, hernia repair  SH: Uses marijuana during pregnancy  Meds: PNV  All: NKDA        Vital Signs Last 24 Hrs  T(C): 36.6 (2024 14:05), Max: 36.7 (2024 19:23)  T(F): 97.9 (2024 14:05), Max: 98.1 (2024 19:23)  HR: 42 (2024 16:14) (38 - 71)  BP: 145/92 (2024 16:10) (91/52 - 145/92)  BP(mean): 94 (2024 14:05) (94 - 94)  RR: 17 (2024 14:05) (17 - 18)  SpO2: 100% (2024 16:14) (93% - 100%)    Parameters below as of 2024 14:05  Patient On (Oxygen Delivery Method): room air        PHYSICAL EXAM:    GENERAL: Young female looking comfortable  HEENT: PERRL, +EOMI  NECK: soft, Supple, No JVD  CHEST/LUNG: Clear to auscultate bilaterally; No wheezing  HEART: S1S2+, Regular rate and rhythm; No murmurs  ABDOMEN: Soft, Nontender, Nondistended; Bowel sounds present, lower abdominal with some dressings on  EXTREMITIES:  1+ Peripheral Pulses, No edema  SKIN: No rashes or lesions  NEURO: AAOX3  PSYCH: normal mood      LABS:                        12.7   4.27  )-----------( 236      ( 2024 10:56 )             38.3           I&O's Summary    2024 07:01  -  2024 17:32  --------------------------------------------------------  IN: 1950 mL / OUT: 597 mL / NET: 1353 mL        MEDICATIONS  (STANDING):  acetaminophen     Tablet .. 975 milliGRAM(s) Oral <User Schedule>  chlorhexidine 2% Cloths 1 Application(s) Topical daily  diphtheria/tetanus/pertussis (acellular) Vaccine (Adacel) 0.5 milliLiter(s) IntraMuscular once  etonogestrel Implant 68 milliGRAM(s) SubDermal once  ibuprofen  Tablet. 600 milliGRAM(s) Oral every 6 hours  ketorolac   Injectable 30 milliGRAM(s) IV Push every 6 hours  lactated ringers. 1000 milliLiter(s) (125 mL/Hr) IV Continuous <Continuous>  lactated ringers. 1000 milliLiter(s) (200 mL/Hr) IV Continuous <Continuous>  lactated ringers. 1000 milliLiter(s) (125 mL/Hr) IV Continuous <Continuous>  oxytocin Infusion 333.333 milliUNIT(s)/Min (1000 mL/Hr) IV Continuous <Continuous>  oxytocin Infusion 333.333 milliUNIT(s)/Min (1000 mL/Hr) IV Continuous <Continuous>    MEDICATIONS  (PRN):  diphenhydrAMINE 25 milliGRAM(s) Oral every 6 hours PRN Pruritus  lanolin Ointment 1 Application(s) Topical every 6 hours PRN Sore Nipples  magnesium hydroxide Suspension 30 milliLiter(s) Oral two times a day PRN Constipation  oxyCODONE    IR 5 milliGRAM(s) Oral every 3 hours PRN Moderate to Severe Pain (4-10)  oxyCODONE    IR 5 milliGRAM(s) Oral once PRN Moderate to Severe Pain (4-10)  simethicone 80 milliGRAM(s) Chew every 4 hours PRN Gas

## 2024-06-14 VITALS
HEART RATE: 76 BPM | DIASTOLIC BLOOD PRESSURE: 58 MMHG | RESPIRATION RATE: 18 BRPM | OXYGEN SATURATION: 99 % | SYSTOLIC BLOOD PRESSURE: 106 MMHG | TEMPERATURE: 98 F

## 2024-06-14 LAB
BASOPHILS # BLD AUTO: 0.02 K/UL — SIGNIFICANT CHANGE UP (ref 0–0.2)
BASOPHILS NFR BLD AUTO: 0.2 % — SIGNIFICANT CHANGE UP (ref 0–2)
EOSINOPHIL # BLD AUTO: 0 K/UL — SIGNIFICANT CHANGE UP (ref 0–0.5)
EOSINOPHIL NFR BLD AUTO: 0 % — SIGNIFICANT CHANGE UP (ref 0–6)
HCT VFR BLD CALC: 28.5 % — LOW (ref 34.5–45)
HGB BLD-MCNC: 9.5 G/DL — LOW (ref 11.5–15.5)
HIV 1+2 AB+HIV1 P24 AG SERPL QL IA: SIGNIFICANT CHANGE UP
IMM GRANULOCYTES NFR BLD AUTO: 0.4 % — SIGNIFICANT CHANGE UP (ref 0–0.9)
LYMPHOCYTES # BLD AUTO: 1.9 K/UL — SIGNIFICANT CHANGE UP (ref 1–3.3)
LYMPHOCYTES # BLD AUTO: 16.9 % — SIGNIFICANT CHANGE UP (ref 13–44)
MCHC RBC-ENTMCNC: 30.1 PG — SIGNIFICANT CHANGE UP (ref 27–34)
MCHC RBC-ENTMCNC: 33.3 GM/DL — SIGNIFICANT CHANGE UP (ref 32–36)
MCV RBC AUTO: 90.2 FL — SIGNIFICANT CHANGE UP (ref 80–100)
MONOCYTES # BLD AUTO: 0.65 K/UL — SIGNIFICANT CHANGE UP (ref 0–0.9)
MONOCYTES NFR BLD AUTO: 5.8 % — SIGNIFICANT CHANGE UP (ref 2–14)
NEUTROPHILS # BLD AUTO: 8.61 K/UL — HIGH (ref 1.8–7.4)
NEUTROPHILS NFR BLD AUTO: 76.7 % — SIGNIFICANT CHANGE UP (ref 43–77)
PLATELET # BLD AUTO: 224 K/UL — SIGNIFICANT CHANGE UP (ref 150–400)
RBC # BLD: 3.16 M/UL — LOW (ref 3.8–5.2)
RBC # FLD: 13.3 % — SIGNIFICANT CHANGE UP (ref 10.3–14.5)
T PALLIDUM AB TITR SER: NEGATIVE — SIGNIFICANT CHANGE UP
WBC # BLD: 11.23 K/UL — HIGH (ref 3.8–10.5)
WBC # FLD AUTO: 11.23 K/UL — HIGH (ref 3.8–10.5)

## 2024-06-14 PROCEDURE — 86703 HIV-1/HIV-2 1 RESULT ANTBDY: CPT

## 2024-06-14 PROCEDURE — 59025 FETAL NON-STRESS TEST: CPT

## 2024-06-14 PROCEDURE — 93005 ELECTROCARDIOGRAM TRACING: CPT

## 2024-06-14 PROCEDURE — 88307 TISSUE EXAM BY PATHOLOGIST: CPT

## 2024-06-14 PROCEDURE — 86900 BLOOD TYPING SEROLOGIC ABO: CPT

## 2024-06-14 PROCEDURE — 99232 SBSQ HOSP IP/OBS MODERATE 35: CPT

## 2024-06-14 PROCEDURE — 59050 FETAL MONITOR W/REPORT: CPT

## 2024-06-14 PROCEDURE — 86780 TREPONEMA PALLIDUM: CPT

## 2024-06-14 PROCEDURE — 36415 COLL VENOUS BLD VENIPUNCTURE: CPT

## 2024-06-14 PROCEDURE — 86850 RBC ANTIBODY SCREEN: CPT

## 2024-06-14 PROCEDURE — 87389 HIV-1 AG W/HIV-1&-2 AB AG IA: CPT

## 2024-06-14 PROCEDURE — 86901 BLOOD TYPING SEROLOGIC RH(D): CPT

## 2024-06-14 PROCEDURE — 85025 COMPLETE CBC W/AUTO DIFF WBC: CPT

## 2024-06-14 RX ORDER — OXYCODONE HYDROCHLORIDE 5 MG/1
5 TABLET ORAL
Refills: 0 | Status: DISCONTINUED | OUTPATIENT
Start: 2024-06-14 | End: 2024-06-14

## 2024-06-14 RX ORDER — ACETAMINOPHEN 500 MG
3 TABLET ORAL
Qty: 360 | Refills: 0
Start: 2024-06-14 | End: 2024-07-13

## 2024-06-14 RX ORDER — OXYCODONE HYDROCHLORIDE 5 MG/1
5 TABLET ORAL ONCE
Refills: 0 | Status: DISCONTINUED | OUTPATIENT
Start: 2024-06-14 | End: 2024-06-14

## 2024-06-14 RX ORDER — IBUPROFEN 200 MG
1 TABLET ORAL
Qty: 120 | Refills: 0
Start: 2024-06-14 | End: 2024-07-13

## 2024-06-14 RX ORDER — IBUPROFEN 200 MG
600 TABLET ORAL EVERY 6 HOURS
Refills: 0 | Status: DISCONTINUED | OUTPATIENT
Start: 2024-06-14 | End: 2024-06-14

## 2024-06-14 RX ADMIN — Medication 600 MILLIGRAM(S): at 00:35

## 2024-06-14 RX ADMIN — SIMETHICONE 80 MILLIGRAM(S): 80 TABLET, CHEWABLE ORAL at 12:39

## 2024-06-14 RX ADMIN — OXYCODONE HYDROCHLORIDE 5 MILLIGRAM(S): 5 TABLET ORAL at 16:02

## 2024-06-14 RX ADMIN — OXYCODONE HYDROCHLORIDE 5 MILLIGRAM(S): 5 TABLET ORAL at 09:19

## 2024-06-14 RX ADMIN — SIMETHICONE 80 MILLIGRAM(S): 80 TABLET, CHEWABLE ORAL at 20:22

## 2024-06-14 RX ADMIN — Medication 600 MILLIGRAM(S): at 05:50

## 2024-06-14 RX ADMIN — Medication 600 MILLIGRAM(S): at 12:38

## 2024-06-14 RX ADMIN — OXYCODONE HYDROCHLORIDE 5 MILLIGRAM(S): 5 TABLET ORAL at 20:22

## 2024-06-14 RX ADMIN — ENOXAPARIN SODIUM 40 MILLIGRAM(S): 100 INJECTION SUBCUTANEOUS at 00:08

## 2024-06-14 RX ADMIN — OXYCODONE HYDROCHLORIDE 5 MILLIGRAM(S): 5 TABLET ORAL at 17:02

## 2024-06-14 RX ADMIN — OXYCODONE HYDROCHLORIDE 5 MILLIGRAM(S): 5 TABLET ORAL at 10:19

## 2024-06-14 NOTE — PROGRESS NOTE ADULT - ATTENDING COMMENTS
POD 1  no complaints  Hb 9.5 , mild asymptomatic anemia   abd soft, ND, wound intact  ext no edema   plan  routine care  DVT ppx

## 2024-06-14 NOTE — PROGRESS NOTE ADULT - ASSESSMENT
IS   19y  at 37 weeks GA who presents to L&D for rCS due to IUGR at 37w, she under went C section and post op she was bradycardiac to low 40s, she got Precedex and some other anesthesia medication and she became some bradycardiac after getting those, she denies any chest pain, palpitation, sob, dizziness, nausea, vomiting, her HR is high forties now. she has no fever, chills, chest pain, sob, medicine team consulted for evaluation of bradycardia.      Plan:     Sinus Bradycardia:   Likely from Anesthesia meds   EKG reviewed  HR is coming up slowly now in 56s   will continue to monitor   Utox not done     C section:   Management as per Primary team  IS   Stefani amaral   Bowel meds   DVT prophylaxis as per Primary team.    multivitamins  wound care    Acute blood loss anemia due to procedure: Iron supplement, monitor CBC     HR is coming up, denies any symptoms, medicine team is signing off, please call as needed
A/P:  19y  now POD#1 s/p  section at 37 weeks gestation, complicated by bradycardia post operative.   -Medicine consult appreciated, bradycardia likely secondary to medication given during   -Patient declined tele monitoring  -Vital Signs Stable now  -Voiding, tolerating PO, bowel function nml   -Heme: Hgb 12.7 -->9.5   -Advance care as tolerated   -Continue routine postpartum/postoperative care and education  -Healthy male infant, desires circumcision.  -Dispo: Continue inpatient care

## 2024-06-14 NOTE — CHART NOTE - NSCHARTNOTEFT_GEN_A_CORE
POD 1  pt would like to go home   her Hb is 9  she has no complaints  she is breast feeding   baby pediatrician will be in [facility].  Pt will come to [place] for post partum visit in 2 weeks  she accepts Community health worker visit to her house and accepts PP  service  nexplanon is in placed  plan   DC home today   take tylenol and motrin at home   follow up in clinic in 2 weeks
all other ROS negative except as per HPI

## 2024-06-14 NOTE — PROGRESS NOTE ADULT - SUBJECTIVE AND OBJECTIVE BOX
JANENE MONROE is a 19y  now POD#1 s/p  section at 37 weeks gestation, complicated by bradycardia post operative.     S:    The patient has no complaints.   Pain is controlled with current treatment regimen.   She is ambulating without difficulty and tolerating PO.   + flatus/-BM/+ voiding   She endorses appropriate lochia, which is decreasing.   She is breastfeeding without difficulty.   Denies HA, CP, SOB, leg pain  O:    T(C): 36.6 (24 @ 05:02), Max: 36.8 (24 @ 19:16)  HR: 59 (24 @ 05:02) (38 - 65)  BP: 115/68 (24 @ 05:02) (91/52 - 145/92)  RR: 18 (24 @ 05:02) (16 - 18)  SpO2: 100% (24 @ 05:02) (93% - 100%)  Wt(kg): --    Gen: NAD, AOx3  Breast: Nontender, not engorged   Abdomen:  Soft, non-tender, non-distended  Uterus:  Fundus firm below umbilicus  Incision:  Clean/dry/intact with steri-strips in place  VE:  +Lochia  Ext:  Non-tender, non-edematous                           9.5    11.23 )-----------( 224      ( 2024 05:16 )             28.5             
JANENE MONROE    [id number]    19y      Female    Patient is a 19y old  Female who presents with a chief complaint of     INTERVAL HPI/OVERNIGHT EVENTS:    Patient is doing ok, she has some pain at the surgical site  has no fever, chills, chest pain, SOB, nausea, vomiting, constipation.       Vital Signs Last 24 Hrs  T(C): 36.7 (14 Jun 2024 08:40), Max: 36.8 (13 Jun 2024 19:16)  T(F): 98 (14 Jun 2024 08:40), Max: 98.2 (13 Jun 2024 19:16)  HR: 56 (14 Jun 2024 08:40) (38 - 65)  BP: 122/78 (14 Jun 2024 08:40) (91/52 - 145/92)  BP(mean): 94 (13 Jun 2024 14:05) (94 - 94)  RR: 17 (14 Jun 2024 08:40) (16 - 18)  SpO2: 100% (14 Jun 2024 08:40) (93% - 100%)    Parameters below as of 14 Jun 2024 08:40  Patient On (Oxygen Delivery Method): room air        PHYSICAL EXAM:    GENERAL: Young female looking comfortable  HEENT: PERRL, +EOMI  NECK: soft, Supple, No JVD  CHEST/LUNG: Clear to auscultate bilaterally; No wheezing  HEART: S1S2+, Regular rate and rhythm; No murmurs  ABDOMEN: Soft, Nontender, Nondistended; Bowel sounds present, lower abdominal with some dressings on  EXTREMITIES:  1+ Peripheral Pulses, No edema  SKIN: No rashes or lesions  NEURO: AAOX3  PSYCH: normal mood      LABS:                        9.5    11.23 )-----------( 224      ( 14 Jun 2024 05:16 )             28.5       I&O's Summary    13 Jun 2024 07:01  -  14 Jun 2024 07:00  --------------------------------------------------------  IN: 1950 mL / OUT: 1047 mL / NET: 903 mL        MEDICATIONS  (STANDING):  acetaminophen     Tablet .. 975 milliGRAM(s) Oral <User Schedule>  diphtheria/tetanus/pertussis (acellular) Vaccine (Adacel) 0.5 milliLiter(s) IntraMuscular once  enoxaparin Injectable 40 milliGRAM(s) SubCutaneous every 24 hours  etonogestrel Implant 68 milliGRAM(s) SubDermal once  ibuprofen  Tablet. 600 milliGRAM(s) Oral every 6 hours  ketorolac   Injectable 30 milliGRAM(s) IV Push every 6 hours  lactated ringers. 1000 milliLiter(s) (125 mL/Hr) IV Continuous <Continuous>  lactated ringers. 1000 milliLiter(s) (200 mL/Hr) IV Continuous <Continuous>  oxytocin Infusion 333.333 milliUNIT(s)/Min (1000 mL/Hr) IV Continuous <Continuous>  oxytocin Infusion 333.333 milliUNIT(s)/Min (1000 mL/Hr) IV Continuous <Continuous>    MEDICATIONS  (PRN):  diphenhydrAMINE 25 milliGRAM(s) Oral every 6 hours PRN Pruritus  lanolin Ointment 1 Application(s) Topical every 6 hours PRN Sore Nipples  magnesium hydroxide Suspension 30 milliLiter(s) Oral two times a day PRN Constipation  oxyCODONE    IR 5 milliGRAM(s) Oral once PRN Moderate to Severe Pain (4-10)  oxyCODONE    IR 5 milliGRAM(s) Oral every 3 hours PRN Moderate to Severe Pain (4-10)  simethicone 80 milliGRAM(s) Chew every 4 hours PRN Gas

## 2024-08-29 NOTE — OB RN TRIAGE NOTE - ABORTIONS, OB PROFILE
[TextEntry] : antibiotics as ordered for skin infection follow up if any new.worsening symtpoms mother declined testing, runny/stuffy nose and pharyntigitis likely from viral infection 1

## 2024-12-04 NOTE — OB RN TRIAGE NOTE - FALL HARM RISK - FALLEN IN PAST
Pt calledi n to state she believes she has a UTI or something and would like you to put in a lab order for a UA to [facility]  
No

## 2024-12-16 NOTE — ED PROVIDER NOTE - NS ED ATTENDING STATEMENT MOD
MEDICARE WELLNESS VISIT + SOAP NOTE    Patient Care Team:  Ale Lanier MD as PCP - General (Family Medicine)    Zina presents for her Subsequent Annual Medicare Wellness Visit with Medicare Wellness Visit (Subsequent) and Office Visit   Acute and chronic problems were discussed separately below.    Status MWV Topics Details (Refresh Note to Update)    Depression Screening (Trend)   PHQ2 Interpretation Negative          PHQ2: Score = 0     Depression screening is negative no further plan needed.      Blood Pressure  Weight  BMI     /70  Current Weight 161 lbs  body mass index is 31.44 kg/m².  BMI is in obese range.   - healthy lifestyle modifications reviewed      Advanced Directives on File Yes on file.        Health Risk Assessment  (Click to Review or Edit)   Completed      Falls Risk  Have you had a fall in the past year?................................. No  Do you feel unsteady when standing or walking?........ No  Do you worry about falling?.................................................. No  Gait/Balance: Normal      Tobacco/Alcohol/Drugs Never Smoker      reports current alcohol use.   reports no history of drug use.      Opioid Review (Update Meds)      No opioid on med list.  is not taking opioid medications.      Cognitive/Functional Status  (Optional MOCA  Mini Cog)   preexisting cognitive issues - stable    Vision and Hearing Screens    No results found.No results found.      Care Gaps/Screenings  (Click to Review and Order) See patient instructions and orders         The following items on the Medicare Health Risk Assessment were found to be positive  6 b.) How many servings of High Fiber / Whole Grain Foods to you have each day ( 1 serving = 1 cup cold cereal, 1/2 cup cooked cereal, 1 slice bread): 1 per day     13.) Do you need help with any of the following activities?: Get to places outside of walking distance (can't drive alone, or take a bus/taxi alone), Go shopping for  groceries or clothes     15.) How confident are you that you can control and manage most of your health problems?: Somewhat confident         I reviewed and updated the past Medical, Surgical, Family, Social History, Allergies and Medications in Epic: Yes    History reviewed. No pertinent family history.     SOAP NOTE       Subjective     Zina is a 83 year old here for Medicare Wellness Visit (Subsequent) and Office Visit      Work/School:  Lives with:  Self Feels safe:  safe     Number of children:   1 boys   2 girls       Exercise: does morning exercises in senior community and walking.     Diet:  Do you eat 3 meals in a day?: 3  What does pt typically have for-(see below)  Breakfast: leblanc, eggs, coffee bread  Lunch: meat, soups, salads, fruit  Supper: meat, veggies  Normally drinks (ex: water, soda, coffee): water, coffee, juice  Amount of water for one day: 5 cups     Screening:   Personal history of MI/Stroke/heart disease:  no  Personal history of cancer:  no  Last colonoscopy:  4/22/2016  Last mammogram: 1/10/22  PAP: 4/10/2008  Taking VitD/Ca: both   Dental care: last saw 2 years ago   Ophthalmology: utd  tobacco or drugs: no  Alcohol use: rare x1 month beer        Patient still reports she still has leg cramps. She has not pursue spine orthopedics.  She purchased a over-the-counter plum and cold leg cramps but only took it once and reported it did not work so she stopped taking it.  She did see Dr Reilly in the past.     Patient follows with Neurology, she was recently assessed by neuropsychology and diagnosed with mild neurocognitive deficit. They have noticed a change with prevagen.  They report there has been no significant decline     Hypertension on lisinopril 10 mg. Does not check BP at home    Patient has a chronic diastolic heart failure stage II. She was referred to Cardiology however based on her echo and asymptomatic status, it was recommended to not pursue with referral at this  time. She was recommended to utilize diuretic, beta-blocker, Jardiance depending on her NT pro BNP and should she become symptomatic.  Patient denies any chest pain, shortness of breath, palpitations, swelling, orthopnea.     She does have osteopenia last DEXA 2021.  Stable 2024.      Elevated fasting glucose.  Does report eating sweets often.     Does have aids but does not wear them consistently     We reviewed CKD stage III on labs        Review of Systems       SDOH Never Smoker       Objective   Vitals:    12/16/24 1035 12/16/24 1040   BP: 124/70 124/70   Pulse: 64    SpO2: 98%    Weight: 73 kg (161 lb)    Height: 5' (1.524 m)    BMI (Calculated): 31.44      Physical Exam  General Appearance:  Alert, cooperative, no distress, appears stated age.  Head:  Normocephalic, without obvious abnormality, atraumatic.   Eyes:  EOMI.  Sclerae normal. PERRL.   Ears:  Hearing normal to spoken voice.  Tympanic membranes and ear canals normal.  Nose: normal mucosa, no lesions or masses, no drainage  Throat:  No oropharyngeal erythema, lesions or exudates, tonsils not enlarged.  Neck:  Supple, symmetrical, trachea midline, no adenopathy.  Thyroid has no enlargement/tenderness/nodules.  Back:  Symmetric, no curvature, range of motion normal, no costovertebral angle tenderness.  Lungs:  Clear to auscultation bilaterally, respirations unlabored.  Heart:  Regular rate and rhythm, S1 and S2 normal, no murmur, rub or gallop.  Abdomen:  Soft, non distended, non-tender, bowel sounds active all four quadrants, no masses, no organomegaly.  Breast Exam:  No asymmetry, masses.  No tenderness over left breast.  Tenderness to palpation over right breast about 8 cm from sternum.  No nipple retraction or discharge.  Overlying skin without creasing, textural changes.   Extremities:  Atraumatic, no cyanosis or edema. Strength symmetric and sensory intact in all 4 extremities.   Pulses:  2+ and symmetric all extremities.  Skin:  Skin color,  texture, turgor normal, no rashes or lesions.  Lymph Nodes:  Cervical, supraclavicular nodes normal.  Neurologic: CN 2-12 are intact.  No nystagmus.  No focal deficits.  Normal  gait.   Psychiatric:  Cooperative.  Appropriate mood and affect.  Normal judgment.  Normal social interaction.               ASSESSMENT AND PLAN              1. Medicare annual wellness visit, subsequent  -      - Subsequent Medicare Wellness Visit - Select if billing add'l E/M  2. Primary hypertension  -     lisinopril (ZESTRIL) 10 MG tablet; Take 1 tablet by mouth daily.  3. Annual physical exam  -     PREV EST AGE >64  -     Thyroid Stimulating Hormone Reflex; Future  -     Lipid Panel With Reflex; Future  4. Breast tenderness  -     US BREAST DIAGNOSTIC ALL 4 QUADRANTS RIGHT; Future  5. Mixed hearing loss, unilateral  6. Chronic diastolic heart failure  (CMD)  -     NT proBNP; Future  7. Asymptomatic varicose veins  8. IFG (impaired fasting glucose)  -     Comprehensive Metabolic Panel; Future  -     Glycohemoglobin; Future  9. Stage 3a chronic kidney disease  (CMD)  -     CBC with Automated Differential; Future  -     Comprehensive Metabolic Panel; Future  -     Microalbumin Urine Random; Future  10. Osteopenia, unspecified location  11. Chronic midline low back pain with sciatica, sciatica laterality unspecified  12. Mild cognitive impairment    - medical history reviewed in detail and chart updated  - health maintenance addressed  - healthy lifestyle choices discussed  - most recent lab results were discussed with patient  - medicare in 1 year, follow-up as needed     Again reviewed to focus on diet high in protein and instead of carbohydrates     We will continue to monitor what appears to be stage 3 chronic kidney disease.  Patient is on lisinopril.  She is not utilizing meloxicam or other NSAIDs.  She was recommended to increase hydration.     Patient has a known mixed hearing loss however does not utilize hearing aids  regularly.  She is actually doing well with this.     Her blood pressure is well-controlled on lisinopril.  She was encouraged to bring blood pressure cuff to her next appointment     Patient has a chronic diastolic heart failure stage II.  She was referred to Cardiology however based on her echo and asymptomatic status, it was recommended to not pursue with referral at this time.  She was recommended to utilize diuretic, beta-blocker, Jardiance depending on her NT pro BNP and should she become symptomatic.  She appears to be asymptomatic.  Her NT proBNP appears stable.     Patient follows with Neurology, recently diagnosed with mild cognitive impairment.  She is on Prevagen.     In regards to her bilateral leg pain, we reviewed that this is most likely related to her lumbar disease on her last visit and today.  I recommended for her to see spine Orthopedics.  Patient feels that symptoms are not significant to pursue further evaluation or other interventions at this time again.  She can trial over-the-counter supplement.  We also reviewed that perhaps it may be her varicose veins and she may benefit from compression stockings.  They can let me know if she changes her mind     I recommended to obtain imaging of right breast given tenderness.     She does have osteopenia last DEXA 2021.  Stable 2024.        Return in about 1 year (around 12/16/2025) for Medicare Wellness Visit.               I have personally performed a face to face diagnostic evaluation on this patient. I have reviewed the ACP note and agree with the history, exam and plan of care, except as noted.

## 2025-01-05 NOTE — OB PROVIDER H&P - VDRL/RPR: DATE, OB PROFILE
Patient: Marybeth Marinelli Hunter    Procedure Summary       Date: 01/05/25 Room / Location: [facility] OR 08 / [facility] MAIN OR    Anesthesia Start: 1357 Anesthesia Stop: 1529    Procedure: Robotic possible open cholecystectomy-remove gallbladder with small incisions, camera and robotic instruments (Abdomen) Diagnosis:       Acute cholecystitis      (Acute cholecystitis [K81.0])    Surgeons: Giovani Costello MD Provider: Gloria Perez CRNA    Anesthesia Type: general ASA Status: 1 - Emergent            Anesthesia Type: general    Vitals  Vitals Value Taken Time   /96 01/05/25 1547   Temp 37.2 °C (99 °F) 01/05/25 1525   Pulse 82 01/05/25 1550   Resp 15 01/05/25 1535   SpO2 100 % 01/05/25 1550   Vitals shown include unfiled device data.        Post Anesthesia Care and Evaluation    Patient location during evaluation: bedside  Patient participation: complete - patient participated  Level of consciousness: awake  Pain management: adequate    Airway patency: patent  PONV Status: none  Cardiovascular status: acceptable  Respiratory status: acceptable  Hydration status: acceptable     02-Aug-2022

## 2025-02-18 NOTE — OB RN PATIENT PROFILE - FAMILY HISTORY
What Type Of Note Output Would You Prefer (Optional)?: Bullet Format Hpi Title: Evaluation of Skin Lesions No pertinent family history in first degree relatives

## 2025-07-08 NOTE — OB RN TRIAGE NOTE - NSDCHEARTRATE_OBGYN_A_OB_NU
Copied from CRM #[id number]. Topic: General Inquiry - Patient Advice  >> Jul 8, 2025  9:59 AM Criss wrote:  Type:  Needs Medical Advice    Who Called: [facility]  Symptoms (please be specific):    How long has patient had these symptoms:    Pharmacy name and phone #:    Would the patient rather a call back or a response via [facility]? call  Best Call Back Number: [phone]  Additional Information: fax over medical ness form, and calling to confirm if they have received and to get a status update   73

## 2025-07-15 NOTE — ED ADULT TRIAGE NOTE - NS ED TRIAGE AVPU SCALE
Called patient assisting to help schedule new patient appointment with referral in the que but there was no answer. Left message for patient to call back and schedule. Provided office phone number for patient.  
Alert-The patient is alert, awake and responds to voice. The patient is oriented to time, place, and person. The triage nurse is able to obtain subjective information.